# Patient Record
Sex: FEMALE | Race: ASIAN | NOT HISPANIC OR LATINO | Employment: FULL TIME | URBAN - METROPOLITAN AREA
[De-identification: names, ages, dates, MRNs, and addresses within clinical notes are randomized per-mention and may not be internally consistent; named-entity substitution may affect disease eponyms.]

---

## 2022-10-17 LAB
HEPATITIS B SURFACE ANTIGEN (EXTERNAL): NEGATIVE
HIV1+2 AB SERPL QL IA: NEGATIVE
HIV1+2 AB SERPL QL IA: NEGATIVE
RUBELLA ANTIBODY IGG (EXTERNAL): NORMAL

## 2022-12-18 ENCOUNTER — HOSPITAL ENCOUNTER (OUTPATIENT)
Facility: CLINIC | Age: 32
Discharge: HOME | End: 2022-12-18
Attending: OBSTETRICS & GYNECOLOGY | Admitting: OBSTETRICS & GYNECOLOGY
Payer: COMMERCIAL

## 2022-12-18 VITALS — RESPIRATION RATE: 20 BRPM | DIASTOLIC BLOOD PRESSURE: 61 MMHG | SYSTOLIC BLOOD PRESSURE: 109 MMHG | TEMPERATURE: 98.9 F

## 2022-12-18 DIAGNOSIS — N76.0 BV (BACTERIAL VAGINOSIS): Primary | ICD-10-CM

## 2022-12-18 DIAGNOSIS — B96.89 BV (BACTERIAL VAGINOSIS): Primary | ICD-10-CM

## 2022-12-18 LAB
ALBUMIN UR-MCNC: 10 MG/DL
APPEARANCE UR: CLEAR
BACTERIA #/AREA URNS HPF: ABNORMAL /HPF
BILIRUB UR QL STRIP: NEGATIVE
CLUE CELLS: PRESENT
COLOR UR AUTO: YELLOW
GLUCOSE UR STRIP-MCNC: NEGATIVE MG/DL
HGB UR QL STRIP: NEGATIVE
KETONES UR STRIP-MCNC: 80 MG/DL
LEUKOCYTE ESTERASE UR QL STRIP: ABNORMAL
MUCOUS THREADS #/AREA URNS LPF: PRESENT /LPF
NITRATE UR QL: NEGATIVE
PH UR STRIP: 6.5 [PH] (ref 5–7)
RBC URINE: 2 /HPF
SP GR UR STRIP: 1.02 (ref 1–1.03)
SQUAMOUS EPITHELIAL: 9 /HPF
TRICHOMONAS, WET PREP: ABNORMAL
UROBILINOGEN UR STRIP-MCNC: NORMAL MG/DL
WBC URINE: 6 /HPF
WBC'S/HIGH POWER FIELD, WET PREP: ABNORMAL
YEAST, WET PREP: ABNORMAL

## 2022-12-18 PROCEDURE — 87210 SMEAR WET MOUNT SALINE/INK: CPT | Performed by: OBSTETRICS & GYNECOLOGY

## 2022-12-18 PROCEDURE — 81001 URINALYSIS AUTO W/SCOPE: CPT | Performed by: OBSTETRICS & GYNECOLOGY

## 2022-12-18 PROCEDURE — 87086 URINE CULTURE/COLONY COUNT: CPT | Performed by: OBSTETRICS & GYNECOLOGY

## 2022-12-18 PROCEDURE — 250N000011 HC RX IP 250 OP 636: Performed by: OBSTETRICS & GYNECOLOGY

## 2022-12-18 PROCEDURE — G0463 HOSPITAL OUTPT CLINIC VISIT: HCPCS

## 2022-12-18 PROCEDURE — 250N000013 HC RX MED GY IP 250 OP 250 PS 637: Performed by: OBSTETRICS & GYNECOLOGY

## 2022-12-18 RX ORDER — ACETAMINOPHEN 325 MG/1
975 TABLET ORAL EVERY 6 HOURS PRN
Status: DISCONTINUED | OUTPATIENT
Start: 2022-12-18 | End: 2022-12-18 | Stop reason: HOSPADM

## 2022-12-18 RX ORDER — ONDANSETRON 4 MG/1
4 TABLET, FILM COATED ORAL EVERY 6 HOURS PRN
Status: DISCONTINUED | OUTPATIENT
Start: 2022-12-18 | End: 2022-12-18 | Stop reason: RX

## 2022-12-18 RX ORDER — METRONIDAZOLE 500 MG/1
500 TABLET ORAL 2 TIMES DAILY
Qty: 14 TABLET | Refills: 0 | Status: SHIPPED | OUTPATIENT
Start: 2022-12-18 | End: 2022-12-25

## 2022-12-18 RX ORDER — ONDANSETRON 4 MG/1
4 TABLET, ORALLY DISINTEGRATING ORAL EVERY 6 HOURS PRN
Status: DISCONTINUED | OUTPATIENT
Start: 2022-12-18 | End: 2022-12-18 | Stop reason: HOSPADM

## 2022-12-18 RX ORDER — LIDOCAINE 40 MG/G
CREAM TOPICAL
Status: DISCONTINUED | OUTPATIENT
Start: 2022-12-18 | End: 2022-12-18 | Stop reason: HOSPADM

## 2022-12-18 RX ADMIN — ACETAMINOPHEN 975 MG: 325 TABLET, FILM COATED ORAL at 17:03

## 2022-12-18 RX ADMIN — ONDANSETRON 4 MG: 4 TABLET, ORALLY DISINTEGRATING ORAL at 17:03

## 2022-12-18 ASSESSMENT — ACTIVITIES OF DAILY LIVING (ADL): ADLS_ACUITY_SCORE: 31

## 2022-12-18 NOTE — PLAN OF CARE
Patient here reporting pain. She reports she has had back pain that goes down to her buttocks, abdominal pain and inner left thigh pain that goes to groin. Also reports she has not been eating and/or drinking well since Thursday. Tried heat to area without improvement. Reports she last attempted to eat at 1900 last evening and had 3 emesis this am. Doppler . TOCO applied.  had called the unit for another patient and was updated of pt's c/os. Orders received.

## 2022-12-18 NOTE — PLAN OF CARE
Dr. Peters on unit and reviewed labs. Orders received to discharge patient. Diagnosis bacterial vaginosis. Will stop and  prescription on way home. Verbalizes understanding of plan of care.

## 2022-12-18 NOTE — DISCHARGE INSTRUCTIONS
Discharge Instruction for Undelivered Patients      You were seen for:  pain  We Consulted: Dr. Peters  You had (Test or Medicine):contraction monitoring    Diet:   Drink 8 to 12 glasses of liquids (milk, juice, water) every day.  You may eat meals and snacks.     Activity:  Call your doctor or nurse midwife if your baby is moving less than usual.     Call your provider if you notice:  Swelling in your face or increased swelling in your hands or legs.  Headaches that are not relieved by Tylenol (acetaminophen).  Changes in your vision (blurring: seeing spots or stars.)  Nausea (sick to your stomach) and vomiting (throwing up).   Weight gain of 5 pounds or more per week.  Heartburn that doesn't go away.  Signs of bladder infection: pain when you urinate (use the toilet), need to go more often and more urgently.  The bag of nation (rupture of membranes) breaks, or you notice leaking in your underwear.  Bright red blood in your underwear.  Abdominal (lower belly) or stomach pain.  Second (plus) baby: Contractions (tightening) less than 10 minutes apart and getting stronger.  *If less than 34 weeks: Contractions (tightening) more than 6 times in one hour.  Increase or change in vaginal discharge (note the color and amount)      Follow-up:  As scheduled in the clinic

## 2022-12-20 LAB — BACTERIA UR CULT: NORMAL

## 2022-12-29 ENCOUNTER — HOSPITAL ENCOUNTER (OUTPATIENT)
Facility: CLINIC | Age: 32
End: 2022-12-29
Admitting: OBSTETRICS & GYNECOLOGY

## 2022-12-29 ENCOUNTER — HOSPITAL ENCOUNTER (OUTPATIENT)
Facility: CLINIC | Age: 32
Discharge: HOME | End: 2022-12-29
Attending: OBSTETRICS & GYNECOLOGY | Admitting: OBSTETRICS & GYNECOLOGY
Payer: COMMERCIAL

## 2022-12-29 ENCOUNTER — HOSPITAL ENCOUNTER (EMERGENCY)
Facility: CLINIC | Age: 32
End: 2022-12-29

## 2022-12-29 VITALS — DIASTOLIC BLOOD PRESSURE: 72 MMHG | SYSTOLIC BLOOD PRESSURE: 104 MMHG | RESPIRATION RATE: 20 BRPM | TEMPERATURE: 97.9 F

## 2022-12-29 DIAGNOSIS — B96.89 BACTERIAL VAGINOSIS: Primary | ICD-10-CM

## 2022-12-29 DIAGNOSIS — K59.00 CONSTIPATION: ICD-10-CM

## 2022-12-29 DIAGNOSIS — N76.0 BACTERIAL VAGINOSIS: Primary | ICD-10-CM

## 2022-12-29 LAB
ALBUMIN UR-MCNC: NEGATIVE MG/DL
APPEARANCE UR: CLEAR
BACTERIA #/AREA URNS HPF: ABNORMAL /HPF
BILIRUB UR QL STRIP: NEGATIVE
CLUE CELLS: PRESENT
COLOR UR AUTO: ABNORMAL
GLUCOSE UR STRIP-MCNC: NEGATIVE MG/DL
HGB UR QL STRIP: NEGATIVE
KETONES UR STRIP-MCNC: ABNORMAL MG/DL
LEUKOCYTE ESTERASE UR QL STRIP: ABNORMAL
MUCOUS THREADS #/AREA URNS LPF: PRESENT /LPF
NITRATE UR QL: NEGATIVE
PH UR STRIP: 7 [PH] (ref 5–7)
RBC URINE: 1 /HPF
SP GR UR STRIP: 1.01 (ref 1–1.03)
SQUAMOUS EPITHELIAL: 4 /HPF
TRICHOMONAS, WET PREP: ABNORMAL
UROBILINOGEN UR STRIP-MCNC: NORMAL MG/DL
WBC URINE: 4 /HPF
WBC'S/HIGH POWER FIELD, WET PREP: ABNORMAL
YEAST, WET PREP: ABNORMAL

## 2022-12-29 PROCEDURE — 250N000013 HC RX MED GY IP 250 OP 250 PS 637: Performed by: OBSTETRICS & GYNECOLOGY

## 2022-12-29 PROCEDURE — 81001 URINALYSIS AUTO W/SCOPE: CPT | Performed by: OBSTETRICS & GYNECOLOGY

## 2022-12-29 PROCEDURE — 87210 SMEAR WET MOUNT SALINE/INK: CPT | Performed by: OBSTETRICS & GYNECOLOGY

## 2022-12-29 PROCEDURE — G0463 HOSPITAL OUTPT CLINIC VISIT: HCPCS

## 2022-12-29 RX ORDER — POLYETHYLENE GLYCOL 3350 17 G/17G
1 POWDER, FOR SOLUTION ORAL DAILY
Qty: 30 PACKET | Refills: 0 | Status: ON HOLD | OUTPATIENT
Start: 2022-12-29 | End: 2023-04-05

## 2022-12-29 RX ORDER — ACETAMINOPHEN 325 MG/1
975 TABLET ORAL EVERY 8 HOURS PRN
Status: DISCONTINUED | OUTPATIENT
Start: 2022-12-29 | End: 2022-12-29 | Stop reason: HOSPADM

## 2022-12-29 RX ORDER — METRONIDAZOLE 7.5 MG/G
1 GEL VAGINAL DAILY
Qty: 70 G | Refills: 0 | Status: ON HOLD | OUTPATIENT
Start: 2022-12-29 | End: 2023-04-05

## 2022-12-29 RX ORDER — HYDROXYZINE HYDROCHLORIDE 50 MG/1
50 TABLET, FILM COATED ORAL EVERY 4 HOURS PRN
Status: DISCONTINUED | OUTPATIENT
Start: 2022-12-29 | End: 2022-12-29 | Stop reason: HOSPADM

## 2022-12-29 RX ADMIN — ACETAMINOPHEN 975 MG: 325 TABLET, FILM COATED ORAL at 18:06

## 2022-12-29 NOTE — PROVIDER NOTIFICATION
12/29/22 1746   Provider Notification   Provider Name/Title Dr. Avila   Method of Notification In Department   Request Evaluate - Remote       Dr. Avila notified of pt arrival in department. Orders placed for UA, wet prep and comfort meds. Will update MD with results or sooner PRN.

## 2022-12-29 NOTE — PLAN OF CARE
Data: Patient presented to [facility]: 2022  5:26 PM.  Reason for maternal/fetal assessment is abdominal and low back pain. Patient reports today at work she was very active and felt sharp, lower left sided hip pain; additionally reports constipation with hard stool passing only every 3-4 days; also reports back pain that she states is similar to when she was evaluated on  and found to have BV; also foul smelling uring.  Patient is a .  Prenatal record reviewed. Pregnancy  has been complicated by hx of PPROM and PTD at 27weeks, hx CARI, THC in early pregnancy.  Gestational Age 18w1d. VSS. Fetal movement active - flutters. Patient denies uterine contractions, leaking of vaginal fluid/rupture of membranes, vaginal bleeding, pelvic pressure, nausea, vomiting, headache, visual disturbances, epigastric or URQ pain, significant edema. Denies dysuria, hematuria, or increased vaginal discharge. Support person is not present.   Action: Verbal consent for dopptones. Triage assessment completed. Bill of rights reviewed.  Response: Patient verbalized agreement with plan. Dr Radha Weber was on unit at time of patient arrival, initial orders placed per MD, will update with results and for further orders.

## 2022-12-30 NOTE — DISCHARGE INSTRUCTIONS
Discharge Instruction for Undelivered Patients      You were seen for:  back pain, lower abdominal pain, constipation  We Consulted: Dr. Avila  You had (Test or Medicine): Fetal heart rate via doppler, urinalysis, wet prep     Diet:   Drink 8 to 12 glasses of liquids (milk, juice, water) every day.  You may eat meals and snacks.     Activity:  Call your doctor or nurse midwife if your baby is moving less than usual.     Call your provider if you notice:  Swelling in your face or increased swelling in your hands or legs.  Headaches that are not relieved by Tylenol (acetaminophen).  Changes in your vision (blurring: seeing spots or stars.)  Nausea (sick to your stomach) and vomiting (throwing up).   Weight gain of 5 pounds or more per week.  Heartburn that doesn't go away.  Signs of bladder infection: pain when you urinate (use the toilet), need to go more often and more urgently.  The bag of nation (rupture of membranes) breaks, or you notice leaking in your underwear.  Bright red blood in your underwear.  Abdominal (lower belly) or stomach pain.  For first baby: Contractions (tightening) less than 5 minutes apart for one hour or more.  Second (plus) baby: Contractions (tightening) less than 10 minutes apart and getting stronger.  *If less than 34 weeks: Contractions (tightening) more than 6 times in one hour.  Increase or change in vaginal discharge (note the color and amount)    Follow-up:  As scheduled in the clinic  Call the clinic tomorrow, 12/20, at [phone] to verify that your vaginal progesterone prescription is on the way - start taking this ASAP

## 2022-12-30 NOTE — PROVIDER NOTIFICATION
12/29/22 1825   Provider Notification   Provider Name/Title Dr. Avila   Method of Notification Phone   Request Evaluate - Remote     MD notified of UA results reviewed WBCs WNL, nitrites negative, small amount leukocyte esterase, few bacteria. - WNL, TORB per MD discharge patient to home. See prev notes for medication orders and additional follow up plan.

## 2022-12-30 NOTE — PLAN OF CARE
Data: Patient assessed in the [facility] for low back pain, lower abdominal pain, constipation, BV.  Cervical exam not examined.  Membranes intact.  Contractions/uterine assessment none.  Action:  Presumed adequate fetal oxygenation documented (see flow record). Discharge instructions reviewed.  Patient instructed to report change in fetal movement, vaginal leaking of fluid or bleeding, abdominal pain, or any concerns related to the pregnancy to her nurse/physician.    Response: Orders to discharge home per Radha Weber.  Patient verbalized understanding of education and verbalized agreement with plan. Discharged to home at 1845.

## 2022-12-30 NOTE — PROVIDER NOTIFICATION
12/29/22 1810   Provider Notification   Provider Name/Title Dr. Avila   Method of Notification In Department   Request Evaluate - Remote       MD notified in department of wet prep +clue cells, UA pending. Also reviewed that she took PO treatment with her last occurrence on 12/18. Also, pt reports constipation with BM Q3-4 days that is hard and small. Additionally, pt has not started vaginal progesterone since she is waiting for it to be mailed to her.     VORB per MD:  -Send pt home with Rx for vaginal Metrogel   -Miralax 17g packets x30 days  -Give patient 24-hour clinic number ([phone]) to call tomorrow to confirm that vaginal progesterone is on the way, as she should start this ASAP.   -Update if UA results abnormal

## 2023-02-12 ENCOUNTER — HEALTH MAINTENANCE LETTER (OUTPATIENT)
Age: 33
End: 2023-02-12

## 2023-03-04 LAB
GROUP B STREPTOCOCCUS (EXTERNAL): POSITIVE
GROUP B STREPTOCOCCUS (EXTERNAL): POSITIVE

## 2023-04-05 ENCOUNTER — HOSPITAL ENCOUNTER (OUTPATIENT)
Facility: CLINIC | Age: 33
Setting detail: OBSERVATION
Discharge: HOME | End: 2023-04-06
Attending: OBSTETRICS & GYNECOLOGY | Admitting: OBSTETRICS & GYNECOLOGY
Payer: COMMERCIAL

## 2023-04-05 LAB
ABO/RH(D): NORMAL
ALBUMIN UR-MCNC: NEGATIVE MG/DL
AMPHETAMINES UR QL SCN: NORMAL
ANTIBODY SCREEN: NEGATIVE
APPEARANCE UR: CLEAR
BACTERIA #/AREA URNS HPF: ABNORMAL /HPF
BILIRUB UR QL STRIP: NEGATIVE
BZE UR QL SCN: NORMAL
CANNABINOIDS UR QL SCN: NORMAL
CLUE CELLS: ABNORMAL
COLOR UR AUTO: YELLOW
ERYTHROCYTE [DISTWIDTH] IN BLOOD BY AUTOMATED COUNT: 12.3 % (ref 10–15)
GLUCOSE UR STRIP-MCNC: NEGATIVE MG/DL
HCT VFR BLD AUTO: 35.9 % (ref 35–47)
HGB BLD-MCNC: 11.9 G/DL (ref 11.7–15.7)
HGB UR QL STRIP: NEGATIVE
KETONES UR STRIP-MCNC: NEGATIVE MG/DL
LEUKOCYTE ESTERASE UR QL STRIP: ABNORMAL
MCH RBC QN AUTO: 28.8 PG (ref 26.5–33)
MCHC RBC AUTO-ENTMCNC: 33.1 G/DL (ref 31.5–36.5)
MCV RBC AUTO: 87 FL (ref 78–100)
MUCOUS THREADS #/AREA URNS LPF: PRESENT /LPF
NITRATE UR QL: NEGATIVE
OPIATES UR QL SCN: NORMAL
PCP QUAL URINE (ROCHE): NORMAL
PH UR STRIP: 6.5 [PH] (ref 5–7)
PLATELET # BLD AUTO: 230 10E3/UL (ref 150–450)
RBC # BLD AUTO: 4.13 10E6/UL (ref 3.8–5.2)
RBC URINE: 1 /HPF
SP GR UR STRIP: 1.02 (ref 1–1.03)
SPECIMEN EXPIRATION DATE: NORMAL
SQUAMOUS EPITHELIAL: <1 /HPF
TRICHOMONAS, WET PREP: ABNORMAL
UROBILINOGEN UR STRIP-MCNC: NORMAL MG/DL
WBC # BLD AUTO: 11.3 10E3/UL (ref 4–11)
WBC URINE: 2 /HPF
WBC'S/HIGH POWER FIELD, WET PREP: ABNORMAL
YEAST, WET PREP: ABNORMAL

## 2023-04-05 PROCEDURE — 96361 HYDRATE IV INFUSION ADD-ON: CPT

## 2023-04-05 PROCEDURE — 86780 TREPONEMA PALLIDUM: CPT | Performed by: OBSTETRICS & GYNECOLOGY

## 2023-04-05 PROCEDURE — 80307 DRUG TEST PRSMV CHEM ANLYZR: CPT | Performed by: OBSTETRICS & GYNECOLOGY

## 2023-04-05 PROCEDURE — 120N000001 HC R&B MED SURG/OB

## 2023-04-05 PROCEDURE — 258N000003 HC RX IP 258 OP 636: Performed by: OBSTETRICS & GYNECOLOGY

## 2023-04-05 PROCEDURE — 250N000013 HC RX MED GY IP 250 OP 250 PS 637: Performed by: OBSTETRICS & GYNECOLOGY

## 2023-04-05 PROCEDURE — 99232 SBSQ HOSP IP/OBS MODERATE 35: CPT | Performed by: NURSE PRACTITIONER

## 2023-04-05 PROCEDURE — 86850 RBC ANTIBODY SCREEN: CPT | Performed by: OBSTETRICS & GYNECOLOGY

## 2023-04-05 PROCEDURE — 96372 THER/PROPH/DIAG INJ SC/IM: CPT | Performed by: OBSTETRICS & GYNECOLOGY

## 2023-04-05 PROCEDURE — 85027 COMPLETE CBC AUTOMATED: CPT | Performed by: OBSTETRICS & GYNECOLOGY

## 2023-04-05 PROCEDURE — 96366 THER/PROPH/DIAG IV INF ADDON: CPT

## 2023-04-05 PROCEDURE — 250N000011 HC RX IP 250 OP 636: Performed by: OBSTETRICS & GYNECOLOGY

## 2023-04-05 PROCEDURE — 86901 BLOOD TYPING SEROLOGIC RH(D): CPT | Performed by: OBSTETRICS & GYNECOLOGY

## 2023-04-05 PROCEDURE — 81001 URINALYSIS AUTO W/SCOPE: CPT | Performed by: OBSTETRICS & GYNECOLOGY

## 2023-04-05 PROCEDURE — G0463 HOSPITAL OUTPT CLINIC VISIT: HCPCS | Mod: 25

## 2023-04-05 PROCEDURE — 96365 THER/PROPH/DIAG IV INF INIT: CPT

## 2023-04-05 PROCEDURE — 96376 TX/PRO/DX INJ SAME DRUG ADON: CPT

## 2023-04-05 PROCEDURE — 87210 SMEAR WET MOUNT SALINE/INK: CPT | Performed by: OBSTETRICS & GYNECOLOGY

## 2023-04-05 PROCEDURE — 96374 THER/PROPH/DIAG INJ IV PUSH: CPT

## 2023-04-05 RX ORDER — CARBOPROST TROMETHAMINE 250 UG/ML
250 INJECTION, SOLUTION INTRAMUSCULAR
Status: DISCONTINUED | OUTPATIENT
Start: 2023-04-05 | End: 2023-04-06 | Stop reason: HOSPADM

## 2023-04-05 RX ORDER — METHYLERGONOVINE MALEATE 0.2 MG/ML
200 INJECTION INTRAVENOUS
Status: DISCONTINUED | OUTPATIENT
Start: 2023-04-05 | End: 2023-04-06 | Stop reason: HOSPADM

## 2023-04-05 RX ORDER — ONDANSETRON 2 MG/ML
4 INJECTION INTRAMUSCULAR; INTRAVENOUS EVERY 6 HOURS PRN
Status: DISCONTINUED | OUTPATIENT
Start: 2023-04-05 | End: 2023-04-06 | Stop reason: HOSPADM

## 2023-04-05 RX ORDER — KETOROLAC TROMETHAMINE 30 MG/ML
30 INJECTION, SOLUTION INTRAMUSCULAR; INTRAVENOUS
Status: DISCONTINUED | OUTPATIENT
Start: 2023-04-05 | End: 2023-04-06 | Stop reason: HOSPADM

## 2023-04-05 RX ORDER — OXYTOCIN 10 [USP'U]/ML
10 INJECTION, SOLUTION INTRAMUSCULAR; INTRAVENOUS
Status: DISCONTINUED | OUTPATIENT
Start: 2023-04-05 | End: 2023-04-06 | Stop reason: HOSPADM

## 2023-04-05 RX ORDER — BETAMETHASONE SODIUM PHOSPHATE AND BETAMETHASONE ACETATE 3; 3 MG/ML; MG/ML
12 INJECTION, SUSPENSION INTRA-ARTICULAR; INTRALESIONAL; INTRAMUSCULAR; SOFT TISSUE ONCE
Status: COMPLETED | OUTPATIENT
Start: 2023-04-05 | End: 2023-04-05

## 2023-04-05 RX ORDER — IBUPROFEN 800 MG/1
800 TABLET, FILM COATED ORAL
Status: DISCONTINUED | OUTPATIENT
Start: 2023-04-05 | End: 2023-04-06 | Stop reason: HOSPADM

## 2023-04-05 RX ORDER — FENTANYL CITRATE 50 UG/ML
100 INJECTION, SOLUTION INTRAMUSCULAR; INTRAVENOUS
Status: DISCONTINUED | OUTPATIENT
Start: 2023-04-05 | End: 2023-04-06 | Stop reason: HOSPADM

## 2023-04-05 RX ORDER — METOCLOPRAMIDE 10 MG/1
10 TABLET ORAL EVERY 6 HOURS PRN
Status: DISCONTINUED | OUTPATIENT
Start: 2023-04-05 | End: 2023-04-06 | Stop reason: HOSPADM

## 2023-04-05 RX ORDER — ONDANSETRON 4 MG/1
4 TABLET, ORALLY DISINTEGRATING ORAL EVERY 6 HOURS PRN
Status: DISCONTINUED | OUTPATIENT
Start: 2023-04-05 | End: 2023-04-06 | Stop reason: HOSPADM

## 2023-04-05 RX ORDER — MISOPROSTOL 200 UG/1
800 TABLET ORAL
Status: DISCONTINUED | OUTPATIENT
Start: 2023-04-05 | End: 2023-04-06 | Stop reason: HOSPADM

## 2023-04-05 RX ORDER — CITRIC ACID/SODIUM CITRATE 334-500MG
30 SOLUTION, ORAL ORAL
Status: DISCONTINUED | OUTPATIENT
Start: 2023-04-05 | End: 2023-04-06 | Stop reason: HOSPADM

## 2023-04-05 RX ORDER — NALOXONE HYDROCHLORIDE 0.4 MG/ML
0.2 INJECTION, SOLUTION INTRAMUSCULAR; INTRAVENOUS; SUBCUTANEOUS
Status: DISCONTINUED | OUTPATIENT
Start: 2023-04-05 | End: 2023-04-06 | Stop reason: HOSPADM

## 2023-04-05 RX ORDER — NIFEDIPINE 10 MG/1
20 CAPSULE ORAL EVERY 30 MIN PRN
Status: DISCONTINUED | OUTPATIENT
Start: 2023-04-05 | End: 2023-04-05 | Stop reason: HOSPADM

## 2023-04-05 RX ORDER — NALOXONE HYDROCHLORIDE 0.4 MG/ML
0.4 INJECTION, SOLUTION INTRAMUSCULAR; INTRAVENOUS; SUBCUTANEOUS
Status: DISCONTINUED | OUTPATIENT
Start: 2023-04-05 | End: 2023-04-06 | Stop reason: HOSPADM

## 2023-04-05 RX ORDER — BETAMETHASONE SODIUM PHOSPHATE AND BETAMETHASONE ACETATE 3; 3 MG/ML; MG/ML
12 INJECTION, SUSPENSION INTRA-ARTICULAR; INTRALESIONAL; INTRAMUSCULAR; SOFT TISSUE EVERY 24 HOURS
Status: DISCONTINUED | OUTPATIENT
Start: 2023-04-05 | End: 2023-04-05 | Stop reason: HOSPADM

## 2023-04-05 RX ORDER — NIFEDIPINE 10 MG/1
20 CAPSULE ORAL ONCE
Status: DISCONTINUED | OUTPATIENT
Start: 2023-04-05 | End: 2023-04-05 | Stop reason: HOSPADM

## 2023-04-05 RX ORDER — MISOPROSTOL 200 UG/1
400 TABLET ORAL
Status: DISCONTINUED | OUTPATIENT
Start: 2023-04-05 | End: 2023-04-06 | Stop reason: HOSPADM

## 2023-04-05 RX ORDER — METOCLOPRAMIDE HYDROCHLORIDE 5 MG/ML
10 INJECTION INTRAMUSCULAR; INTRAVENOUS EVERY 6 HOURS PRN
Status: DISCONTINUED | OUTPATIENT
Start: 2023-04-05 | End: 2023-04-06 | Stop reason: HOSPADM

## 2023-04-05 RX ORDER — SODIUM CHLORIDE, SODIUM LACTATE, POTASSIUM CHLORIDE, CALCIUM CHLORIDE 600; 310; 30; 20 MG/100ML; MG/100ML; MG/100ML; MG/100ML
INJECTION, SOLUTION INTRAVENOUS CONTINUOUS
Status: DISCONTINUED | OUTPATIENT
Start: 2023-04-05 | End: 2023-04-06 | Stop reason: HOSPADM

## 2023-04-05 RX ORDER — PENICILLIN G POTASSIUM 5000000 [IU]/1
5 INJECTION, POWDER, FOR SOLUTION INTRAMUSCULAR; INTRAVENOUS ONCE
Status: DISCONTINUED | OUTPATIENT
Start: 2023-04-05 | End: 2023-04-05 | Stop reason: HOSPADM

## 2023-04-05 RX ORDER — OXYTOCIN/0.9 % SODIUM CHLORIDE 30/500 ML
340 PLASTIC BAG, INJECTION (ML) INTRAVENOUS CONTINUOUS PRN
Status: DISCONTINUED | OUTPATIENT
Start: 2023-04-05 | End: 2023-04-06 | Stop reason: HOSPADM

## 2023-04-05 RX ORDER — NIFEDIPINE 10 MG/1
20 CAPSULE ORAL EVERY 30 MIN PRN
Status: DISCONTINUED | OUTPATIENT
Start: 2023-04-05 | End: 2023-04-06 | Stop reason: HOSPADM

## 2023-04-05 RX ORDER — NIFEDIPINE 10 MG/1
20 CAPSULE ORAL ONCE
Status: COMPLETED | OUTPATIENT
Start: 2023-04-05 | End: 2023-04-05

## 2023-04-05 RX ORDER — PENICILLIN G POTASSIUM 5000000 [IU]/1
5 INJECTION, POWDER, FOR SOLUTION INTRAMUSCULAR; INTRAVENOUS ONCE
Status: COMPLETED | OUTPATIENT
Start: 2023-04-05 | End: 2023-04-05

## 2023-04-05 RX ORDER — PENICILLIN G 3000000 [IU]/50ML
3 INJECTION, SOLUTION INTRAVENOUS EVERY 4 HOURS
Status: DISCONTINUED | OUTPATIENT
Start: 2023-04-05 | End: 2023-04-06 | Stop reason: HOSPADM

## 2023-04-05 RX ORDER — PROCHLORPERAZINE 25 MG
25 SUPPOSITORY, RECTAL RECTAL EVERY 12 HOURS PRN
Status: DISCONTINUED | OUTPATIENT
Start: 2023-04-05 | End: 2023-04-06 | Stop reason: HOSPADM

## 2023-04-05 RX ORDER — NIFEDIPINE 10 MG/1
20 CAPSULE ORAL EVERY 6 HOURS
Status: DISCONTINUED | OUTPATIENT
Start: 2023-04-05 | End: 2023-04-05 | Stop reason: HOSPADM

## 2023-04-05 RX ORDER — OXYTOCIN/0.9 % SODIUM CHLORIDE 30/500 ML
100-340 PLASTIC BAG, INJECTION (ML) INTRAVENOUS CONTINUOUS PRN
Status: DISCONTINUED | OUTPATIENT
Start: 2023-04-05 | End: 2023-04-06 | Stop reason: HOSPADM

## 2023-04-05 RX ORDER — PROCHLORPERAZINE MALEATE 10 MG
10 TABLET ORAL EVERY 6 HOURS PRN
Status: DISCONTINUED | OUTPATIENT
Start: 2023-04-05 | End: 2023-04-06 | Stop reason: HOSPADM

## 2023-04-05 RX ORDER — PENICILLIN G 3000000 [IU]/50ML
3 INJECTION, SOLUTION INTRAVENOUS EVERY 4 HOURS
Status: DISCONTINUED | OUTPATIENT
Start: 2023-04-05 | End: 2023-04-05 | Stop reason: HOSPADM

## 2023-04-05 RX ORDER — NIFEDIPINE 10 MG/1
20 CAPSULE ORAL EVERY 6 HOURS
Status: DISCONTINUED | OUTPATIENT
Start: 2023-04-05 | End: 2023-04-06 | Stop reason: HOSPADM

## 2023-04-05 RX ORDER — ACETAMINOPHEN 325 MG/1
650 TABLET ORAL EVERY 4 HOURS PRN
Status: DISCONTINUED | OUTPATIENT
Start: 2023-04-05 | End: 2023-04-06 | Stop reason: HOSPADM

## 2023-04-05 RX ORDER — TRANEXAMIC ACID 10 MG/ML
1 INJECTION, SOLUTION INTRAVENOUS EVERY 30 MIN PRN
Status: DISCONTINUED | OUTPATIENT
Start: 2023-04-05 | End: 2023-04-06 | Stop reason: HOSPADM

## 2023-04-05 RX ADMIN — SODIUM CHLORIDE, POTASSIUM CHLORIDE, SODIUM LACTATE AND CALCIUM CHLORIDE: 600; 310; 30; 20 INJECTION, SOLUTION INTRAVENOUS at 11:22

## 2023-04-05 RX ADMIN — PENICILLIN G 3 MILLION UNITS: 3000000 INJECTION, SOLUTION INTRAVENOUS at 16:00

## 2023-04-05 RX ADMIN — BETAMETHASONE SODIUM PHOSPHATE AND BETAMETHASONE ACETATE 12 MG: 3; 3 INJECTION, SUSPENSION INTRA-ARTICULAR; INTRALESIONAL; INTRAMUSCULAR at 11:30

## 2023-04-05 RX ADMIN — PENICILLIN G 3 MILLION UNITS: 3000000 INJECTION, SOLUTION INTRAVENOUS at 20:16

## 2023-04-05 RX ADMIN — PENICILLIN G POTASSIUM 5 MILLION UNITS: 5000000 POWDER, FOR SOLUTION INTRAMUSCULAR; INTRAPLEURAL; INTRATHECAL; INTRAVENOUS at 11:54

## 2023-04-05 RX ADMIN — NIFEDIPINE 20 MG: 10 CAPSULE ORAL at 11:51

## 2023-04-05 RX ADMIN — NIFEDIPINE 20 MG: 10 CAPSULE ORAL at 17:59

## 2023-04-05 ASSESSMENT — ACTIVITIES OF DAILY LIVING (ADL)
ADLS_ACUITY_SCORE: 18
ADLS_ACUITY_SCORE: 33
ADLS_ACUITY_SCORE: 18

## 2023-04-05 NOTE — PLAN OF CARE
Data: Patient presented to [facility]: 2023 10:18 AM.  Reason for maternal/fetal assessment advanced cervical dilatation at 32 weeks gestation. Patient reports increased vaginal pressure and lower uterine discomfort. She was seen in the clinic yesterday with a cervical exam of 3 cm. Patient denies leaking of vaginal fluid/rupture of membranes, vaginal bleeding, nausea, vomiting, headache, visual disturbances, epigastric or RUQ pain, significant edema. Patient reports fetal movement is normal. Patient is a 32w0d . Prenatal record reviewed. Pregnancy has been complicated by cervical dilation and history of 27 week delivery . Support person is not present.     Fetal HR baseline was 130, variability is moderate (amplitude range 6 to 25 bpm). Accelerations: increase 15 bpm above baseline lasting 15 seconds. Decelerations: absent. Uterine assessment is mild by palpation during contractions and soft by palpation at rest. Cervix 3.5 cm dilated and 50% effaced. Fetal station -2. Fetal presentation   per cervical exam. Membranes: intact but buldging.    Vital signs wnl. Patient reports pain and is coping.     Action: Verbal consent for EFM. Triage assessment completed. Patient does not meet criteria for early labor discharge.     Response: Patient verbalized agreement with plan. Will contact Dr Jones with update and for further orders.

## 2023-04-05 NOTE — CONSULTS
_            [facility]               Neonatology Advanced Practice Antepartum Counseling Consult      I was asked to provide antepartum counseling for Tatyana Amador at the request of Raina Jones MD secondary to PTL. Ms. Amador is currently 32 weeks and has a hx significant for  delivery. Betamethasone was administered on  and . Ms. Amador was counseled on the expected hospital course, potential risks, and outcomes associated with an infant born at approximately 32 weeks gestation. The counseling included: morbidity, mortality, initial delivery room stabilization, respiratory course, lung development, RDS, patent ductus arteriosus, retinopathy of prematurity, hyperbilirubinemia, hemodynamic support, infection, intraventricular hemorrhage, nutrition, growth and development, and long term outcomes. Please feel free to call with any additional questions or concerns.      Floor Time (min): 10  Face to Face Time (min): 30  Total Time (minutes): 40  More than 50% of my time was spent in direct, face to face, antepartum counseling with the above patient.        TAMIKA Tijerina-CNP 2023 3:39 PM   Advanced Practice Service    Intensive Care Unit  [facility]

## 2023-04-05 NOTE — PROVIDER NOTIFICATION
04/05/23 1409   Provider Notification   Provider Name/Title Dr. Jones   Method of Notification In Department   Request Evaluate in Person   Notification Reason Uterine Activity;Status Update     MD updated: Patient has had 2 contractions in the last hour. MD viewing fetal monitor strip at the desk. Plan to continue to monitor and give scheduled nifedipine every 6 hours.

## 2023-04-05 NOTE — LETTER
[facility]  [address] [place] [address]  Phone: [phone]  Fax: [phone]    April 6, 2023        Tatyana Amador  [address] [place] [address]          To whom it may concern:    RE: Jose Alberto Amador was admitted to the hospital and under our care. Please excuse her partner Jose Alberto Funes from work on 4/5/23-4/7/23.  He may return to work on Monday 4/10/23.       Please contact me for questions or concerns.      Sincerely,    Dr. Jackie Peters, DO

## 2023-04-05 NOTE — H&P
[facility]  OB History and Physical      Tatyana Amador MRN# [id number]   Age: 33 year old YOB: 1990     CC:  Feeling pressure    HPI:  Ms. Tatyana Amador is a 33 year old  at 32w0d by 6 wk US, who presents with pressure. Approximate LMP so this established her due date of 23.  She denies contractions, just pressure, vaginal bleeding, and loss of fluid.   + normal fetal movement.    Pregnancy Complications:  1.  no    Prenatal Labs:   No results found for: ABO, RH, AS, HEPBANG, CHPCRT, GCPCRT, TREPAB, RPR, RUBELLAABIGG, HGB, HIV    GBS Status:   No results found for: GBS  GBS done yesterday, pending    Ultrasounds  1. 3/14/23 EFW 1441 gm 3 lb 2.8 oz 68%    OB History  OB History    Para Term  AB Living   7 3 2 1 3 3   SAB IAB Ectopic Multiple Live Births   2 1 0 0 3      # Outcome Date GA Lbr Damian/2nd Weight Sex Delivery Anes PTL Lv   7 Current            6 Term 19    F Vag-Spont   MANJU      Name: Nilae   5  12 27w0d   M Vag-Spont  Y MANJU      Name: Tre   4 Term 08     Vag-Spont   MANJU      Name: Harmany   3 SAB            2 IAB            1 SAB                PMHx: noner  No past medical history on file.  PSHx: no  No past surgical history on file.  Meds:   Medications Prior to Admission   Medication Sig Dispense Refill Last Dose                       Prenatal MV-Min-Fe Fum-FA-DHA (PRENATAL 1 PO) Take 1 tablet by mouth daily        progesterone 200 MG VA SUPP Place 200 mg vaginally daily Has not started yet        Allergies:    Allergies   Allergen Reactions     Latex Rash      FmHx: No family history on file.  SocHx: She denies any tobacco, alcohol, or other drug use during this pregnancy.    ROS:   Complete 10-point ROS negative except as noted in HPI.She has a minor  headache, no blurry vision, chest pain, shortness of breath,  vomiting, dysuria, hematuria or extremity edema. Some nausea    PE:  Vit:   Patient Vitals for the past  4 hrs:   BP Temp Temp src Pulse Resp   23 1020 (P) 113/74 (P) 98  F (36.7  C) (P) Oral (P) 74 (P) 16      Gen: Well-appearing, NAD, comfortable   CV: rrr, no   Pulm: Ctab   Abd: Soft, gravid, non-tender,   Ext: no LE edema b/l  Cx: 3-4 BBOW    Pres:  vtx by US  EFW:  4-5 lbs by [place]  Memb: intact              FHT: Baseline 150, good variability, positive accelerations, no decelerations   [place]: q2-4 contractions            Assessment  Ms. Tatyana Amador is a 33 year old , at 32w0d by LMP, who presents with pressure and PTL.    Plan  Admit to L&D  Labor: At high risk for  delivery  FWB: Category 1 FHT.  Continue EFM and toco  Pain: Does not want epidural for analgesia  PNC: Rh +, Rubella I, GBS + on 3/4/23, GCT elevated but 2 hr test ok, Placenta ant  Fen/GI: Clear liquid diet, IVF  PTL: repeat beta methasone, nifedipine asap. Start PCN for GBS+ (awaiting result of yesterday's swab, but last month is still valid.     Raina Jones MD  [phone]  2023, 10:54 AM

## 2023-04-05 NOTE — PROVIDER NOTIFICATION
23 1100   Provider Notification   Provider Name/Title Dr. Jones   Method of Notification At Bedside   Request Evaluate in Person   Notification Reason Patient Arrived;Membrane Status;Labor Status;Uterine Activity;Pain;Status Update   MD at bedside to discuss POC with patient after updated by JETT Wen that cervix is 3-4/50/-2 with BBOW, vaginal pressure and history of  delivery at 27 weeks.  labor and intrapartum orders place. Plan for second dose of betamethasone, penicillin for GBS unknown (was collected yesterday, but also was positive 3/1/2023), nifedipine per  labor orders, NNP consult and standard intrapartum orders. MD discussed continued monitoring/evaluation at this time d/t history, SVE, pain and contractions.    Patient informed of need for urine tox due to current or prior illicit or unprescribed drug use including maternal self-report, patient states she stopped using THC once she found out she was pregnant.  Verbal consent obtained and maternal urine sent. Umbilical cord segment will be sent for toxicology.

## 2023-04-06 ENCOUNTER — HOSPITAL ENCOUNTER (OUTPATIENT)
Facility: CLINIC | Age: 33
Setting detail: OBSERVATION
End: 2023-04-06
Admitting: OBSTETRICS & GYNECOLOGY
Payer: COMMERCIAL

## 2023-04-06 VITALS
SYSTOLIC BLOOD PRESSURE: 113 MMHG | HEART RATE: 90 BPM | RESPIRATION RATE: 16 BRPM | DIASTOLIC BLOOD PRESSURE: 62 MMHG | TEMPERATURE: 98.1 F

## 2023-04-06 PROBLEM — O60.00 PRETERM LABOR: Status: ACTIVE | Noted: 2023-04-06

## 2023-04-06 LAB — T PALLIDUM AB SER QL: NONREACTIVE

## 2023-04-06 PROCEDURE — 96376 TX/PRO/DX INJ SAME DRUG ADON: CPT

## 2023-04-06 PROCEDURE — 250N000011 HC RX IP 250 OP 636: Performed by: OBSTETRICS & GYNECOLOGY

## 2023-04-06 PROCEDURE — 250N000013 HC RX MED GY IP 250 OP 250 PS 637: Performed by: OBSTETRICS & GYNECOLOGY

## 2023-04-06 PROCEDURE — 258N000003 HC RX IP 258 OP 636: Performed by: OBSTETRICS & GYNECOLOGY

## 2023-04-06 RX ADMIN — PENICILLIN G 3 MILLION UNITS: 3000000 INJECTION, SOLUTION INTRAVENOUS at 00:12

## 2023-04-06 RX ADMIN — PENICILLIN G 3 MILLION UNITS: 3000000 INJECTION, SOLUTION INTRAVENOUS at 08:09

## 2023-04-06 RX ADMIN — NIFEDIPINE 20 MG: 10 CAPSULE ORAL at 06:05

## 2023-04-06 RX ADMIN — SODIUM CHLORIDE, POTASSIUM CHLORIDE, SODIUM LACTATE AND CALCIUM CHLORIDE: 600; 310; 30; 20 INJECTION, SOLUTION INTRAVENOUS at 06:09

## 2023-04-06 RX ADMIN — NIFEDIPINE 20 MG: 10 CAPSULE ORAL at 00:13

## 2023-04-06 RX ADMIN — PENICILLIN G 3 MILLION UNITS: 3000000 INJECTION, SOLUTION INTRAVENOUS at 12:31

## 2023-04-06 RX ADMIN — PENICILLIN G 3 MILLION UNITS: 3000000 INJECTION, SOLUTION INTRAVENOUS at 04:11

## 2023-04-06 ASSESSMENT — ACTIVITIES OF DAILY LIVING (ADL)
ADLS_ACUITY_SCORE: 18

## 2023-04-06 NOTE — PLAN OF CARE
Patient states she has been sleeping well and feeling rare contractions. Fetal monitoring completed.  Will return at 0600 with nifedipine.  Patient will call RN if any contractions, SROM, or any other concerns.

## 2023-04-06 NOTE — PLAN OF CARE
Patient states she has been sleeping well and feeling rare contractions. Fetal monitoring completed.  Will return at 0400 for monitoring and antibiotics.  Patient will call RN if any contractions, SROM, or any other concerns.

## 2023-04-06 NOTE — UTILIZATION REVIEW
Admission Status; Secondary Review Determination       Under the authority of the Utilization Management Committee, the utilization review process indicated a secondary review on the above patient. The review outcome is based on review of the medical records, discussions with staff, and applying clinical experience noted on the date of the review.     (x) Inpatient Status Appropriate - This patient's medical care is consistent with medical management for inpatient care and reasonable inpatient medical practice.     RATIONALE FOR DETERMINATION   33-year-old female  7 para 2-1-3-3 who presented at 32 weeks gestation with pelvic pressure and advanced cervical dilation.  Established due date of 2023.  High risk pregnancy with history of cervical dilation and delivery at 27 weeks premature delivery.  Has received betamethasone, nifedipine and penicillin G.  Getting continuous fetal monitoring.    At the time of admission with the information available to the attending physician more than 2 nights hospital complex care was anticipated, based on patient risk of adverse outcome if treated as outpatient and complex care required. Inpatient admission is appropriate based on the Medicare guidelines.     However, if there is rapid improvement in her status and she ends up discharging home later today (23), OBSERVATION status is most appropriate.     This document was produced using voice recognition software.    The information on this document is developed by the utilization review team in order for the business office to ensure compliance. This only denotes the appropriateness of proper admission status and does not reflect the quality of care rendered.   The definitions of Inpatient Status and Observation Status used in making the determination above are those provided in the CMS Coverage Manual, Chapter 1 and Chapter 6, section 70.4.     Sincerely,   Francesca Abreu MD  Utilization Review  Physician  Advisor  [facility].

## 2023-04-06 NOTE — DISCHARGE SUMMARY
Antepartum Discharge Summary    Admission date: 2023  Discharge date: 23    Attending: Jackie Peters,      Admission Diagnoses:   - Intrauterine pregnancy @ 32w0d  -  labor    Discharge Diagnoses:  - Intrauterine pregnancy @ 32w1d  -  labor    Procedures:  - Fetal and uterine monitoring    Admit HPI:  Tatyana Amador is a  33 year old  @ 32w1d who was admitted to antepartum due to  contractions and labor. Her pregnancy has been complicated by history of  labor with short cervix with funneling, history of covid, GBS+. Please see her admit H&P for full details of her PMH, PSH, Meds, Allergies and exam on admit.    Hospital course:  Tatyana Amador had fetal and uterine monitoring that showed contractions every 2-4 minutes on arrival. She was started on IV fluids and nifedipine orally. She received her second dose of BMZ.  At 24 hours after last BMZ dose the nifedipine was held and she had no further contractions.  Her cervix remained unchanged and she was discharge to home.     Discharge/Disposition:  Tatyana Amador was discharged to home in stable condition with the following instructions/medications:  - Call for any contractions 5 minutes apart for one hour, vaginal bleeding, leakage or gush of fluid, or decreased fetal movement (less than 10 kicks in 2 hours).  - She was instructed to follow-up with her primary OB within 1 week.          Jackie Peters,     2023

## 2023-04-06 NOTE — DISCHARGE INSTRUCTIONS
"Discharge Instruction for Undelivered Patients      You were seen for: Labor Assessment  We Consulted: Dr. Peters  You had (Test or Medicine): Fetal Monitoring, IV Fluids, IV Penicillin, Nifedipine, Vaginal Exams     Diet:   Drink 8 to 12 glasses of liquids (milk, juice, water) every day.  You may eat meals and snacks.     Activity:  Rest the pelvic area. Nothing per the vagina, no sex, clitoral, breast or nipple stimulation. Try to keep activity around the house \"light.\"     Call your provider if you notice:  Swelling in your face or increased swelling in your hands or legs.  Headaches that are not relieved by Tylenol (acetaminophen).  Changes in your vision (blurring: seeing spots or stars.)  Nausea (sick to your stomach) and vomiting (throwing up).   Weight gain of 5 pounds or more per week.  Heartburn that doesn't go away.  Signs of bladder infection: pain when you urinate (use the toilet), need to go more often and more urgently.  The bag of nation (rupture of membranes) breaks, or you notice leaking in your underwear.  Bright red blood in your underwear.  Abdominal (lower belly) or stomach pain.  For first baby: Contractions (tightening) less than 5 minutes apart for one hour or more.  Second (plus) baby: Contractions (tightening) less than 10 minutes apart and getting stronger.  *If less than 34 weeks: Contractions (tightening) more than 6 times in one hour.  Increase or change in vaginal discharge (note the color and amount)  Other:     Follow-up:  As scheduled in the clinic          "

## 2023-04-06 NOTE — PROVIDER NOTIFICATION
04/06/23 0811   Provider Notification   Provider Name/Title Dr. Peters   Method of Notification In Department   Request Evaluate - Remote   Notification Reason Status Update     Dr. Peters in department, briefly updated on patients status. MD states to hold 1200 dose of Nifedipine and see if contractions return in the afternoon. If no Uc's then anticipate PM discharge.

## 2023-04-06 NOTE — PROVIDER NOTIFICATION
04/05/23 1945   Provider Notification   Provider Name/Title Dr. Reyes   Method of Notification Phone   Request Evaluate - Remote   Notification Reason Status Update     Orders received to take off continous monitoring and can switch to every 4 hours of monitoring and monitor for an hour.  If contractions increase in frequency go back to continuous.  Will continue oral nifedipine and Iv ABX at this time.

## 2023-05-03 ENCOUNTER — HOSPITAL ENCOUNTER (OUTPATIENT)
Facility: CLINIC | Age: 33
Discharge: HOME | End: 2023-05-03
Attending: OBSTETRICS & GYNECOLOGY | Admitting: OBSTETRICS & GYNECOLOGY
Payer: COMMERCIAL

## 2023-05-03 VITALS
HEART RATE: 99 BPM | DIASTOLIC BLOOD PRESSURE: 76 MMHG | RESPIRATION RATE: 18 BRPM | HEIGHT: 65 IN | SYSTOLIC BLOOD PRESSURE: 125 MMHG | WEIGHT: 176 LBS | BODY MASS INDEX: 29.32 KG/M2 | TEMPERATURE: 98.9 F

## 2023-05-03 PROBLEM — Z36.89 ENCOUNTER FOR TRIAGE IN PREGNANT PATIENT: Status: ACTIVE | Noted: 2023-05-03

## 2023-05-03 LAB
ALBUMIN UR-MCNC: 30 MG/DL
APPEARANCE UR: ABNORMAL
BILIRUB UR QL STRIP: NEGATIVE
COLOR UR AUTO: YELLOW
GLUCOSE UR STRIP-MCNC: NEGATIVE MG/DL
HGB UR QL STRIP: ABNORMAL
HYALINE CASTS: 2 /LPF
KETONES UR STRIP-MCNC: ABNORMAL MG/DL
LEUKOCYTE ESTERASE UR QL STRIP: ABNORMAL
MUCOUS THREADS #/AREA URNS LPF: PRESENT /LPF
NITRATE UR QL: NEGATIVE
PH UR STRIP: 6.5 [PH] (ref 5–7)
RBC URINE: >182 /HPF
SP GR UR STRIP: 1.02 (ref 1–1.03)
SQUAMOUS EPITHELIAL: 17 /HPF
UROBILINOGEN UR STRIP-MCNC: NORMAL MG/DL
WBC URINE: 26 /HPF

## 2023-05-03 PROCEDURE — 250N000013 HC RX MED GY IP 250 OP 250 PS 637: Performed by: OBSTETRICS & GYNECOLOGY

## 2023-05-03 PROCEDURE — G0463 HOSPITAL OUTPT CLINIC VISIT: HCPCS

## 2023-05-03 PROCEDURE — 87086 URINE CULTURE/COLONY COUNT: CPT | Performed by: OBSTETRICS & GYNECOLOGY

## 2023-05-03 PROCEDURE — 81001 URINALYSIS AUTO W/SCOPE: CPT | Performed by: OBSTETRICS & GYNECOLOGY

## 2023-05-03 RX ORDER — ACETAMINOPHEN 325 MG/1
650 TABLET ORAL EVERY 4 HOURS PRN
Status: DISCONTINUED | OUTPATIENT
Start: 2023-05-03 | End: 2023-05-03 | Stop reason: HOSPADM

## 2023-05-03 RX ORDER — LIDOCAINE 40 MG/G
CREAM TOPICAL
Status: DISCONTINUED | OUTPATIENT
Start: 2023-05-03 | End: 2023-05-03 | Stop reason: HOSPADM

## 2023-05-03 RX ORDER — ACETAMINOPHEN 650 MG/1
650 SUPPOSITORY RECTAL EVERY 4 HOURS PRN
Status: DISCONTINUED | OUTPATIENT
Start: 2023-05-03 | End: 2023-05-03 | Stop reason: HOSPADM

## 2023-05-03 RX ADMIN — ACETAMINOPHEN 650 MG: 325 TABLET ORAL at 15:36

## 2023-05-03 ASSESSMENT — ACTIVITIES OF DAILY LIVING (ADL)
ADLS_ACUITY_SCORE: 35
ADLS_ACUITY_SCORE: 35

## 2023-05-03 NOTE — PROVIDER NOTIFICATION
Data: Patient presented to [facility]: 5/3/2023  2:47 PM.  Reason for maternal/fetal assessment is uterine contractions/RO labor. Patient reports lower back pain, pelvic pressure and contractions.  Patient is a .  Prenatal record reviewed. Pregnancy  has been complicated by  labor/contractions and history of  delivery.  Gestational Age 36w0d. VSS. Fetal movement active. Patient denies leaking of vaginal fluid/rupture of membranes, vaginal bleeding. Support person is present.   Action: Verbal consent for EFM. Triage assessment completed. Bill of rights reviewed.  Response: Patient verbalized agreement with plan. Will contact Dr Raina Jones with update and for further orders.   1534- Dr. Jones at bedside. Plan per provider: send urine for urinalysis and give patient Tylenol and regular diet.

## 2023-05-03 NOTE — PROGRESS NOTES
"32 yo  at 36w0 d here for pelvic pressure and pain and back pain. Denies fevers or dysuria, but does have some urinary urgency.     /76   Pulse 99   Temp 98.9  F (37.2  C) (Oral)   Resp 18   Ht 1.651 m (5' 5\")   Wt 79.8 kg (176 lb)   BMI 29.29 kg/m     Gen: tired female NAD  Abd gravid NT  Back no CVAT.   FHTS reassuring  TOCO rare  Cvx 3.5 per RN, unchanged    Urine dark sharath    A/P 36 w with urinary urgency and pelvic pressure  Suspect musculoskeletal discomfort of pregnancy but will send urine to rule out UTI.   Recommend comfort measures for back pain: heat, tylenol, massage. If symptoms worsen, let us know.   "

## 2023-05-03 NOTE — PLAN OF CARE
Discharge instructions reviewed with patient. Understanding verbalized. Discharged to home ambulatory with daughter.

## 2023-05-03 NOTE — PLAN OF CARE
Data: Patient assessed in the [facility]place for abdominal pain, pelvic pain.  Cervical exam dilated to 3.5.  Membranes intact.  Contractions/uterine assessment occasional contractions.  Action:  Presumed adequate fetal oxygenation documented (see flow record). Discharge instructions reviewed.  Patient instructed to report change in fetal movement, vaginal leaking of fluid or bleeding, abdominal pain, or any concerns related to the pregnancy to her nurse/physician.    Response: Orders to discharge home per Raina Jones.  Patient verbalized understanding of education and verbalized agreement with plan. Discharged to home at 1645.

## 2023-05-03 NOTE — DISCHARGE INSTRUCTIONS
Discharge Instruction for Undelivered Patients      You were seen for: Labor Assessment  We Consulted: Dr. Jones  You had (Test or Medicine):fetal and uterine monitoring, SVE and urinalysis     Diet:   Drink 8 to 12 glasses of liquids (milk, juice, water) every day.  You may eat meals and snacks.  Increase fluid intake as able.     Activity:  Call your doctor or nurse midwife if your baby is moving less than usual.     Call your provider if you notice:  Swelling in your face or increased swelling in your hands or legs.  Headaches that are not relieved by Tylenol (acetaminophen).  Changes in your vision (blurring: seeing spots or stars.)  Nausea (sick to your stomach) and vomiting (throwing up).   Weight gain of 5 pounds or more per week.  Heartburn that doesn't go away.  Signs of bladder infection: pain when you urinate (use the toilet), need to go more often and more urgently.  The bag of nation (rupture of membranes) breaks, or you notice leaking in your underwear.  Bright red blood in your underwear.  Abdominal (lower belly) or stomach pain.  For first baby: Contractions (tightening) less than 5 minutes apart for one hour or more.  Second (plus) baby: Contractions (tightening) less than 10 minutes apart and getting stronger.  *If less than 34 weeks: Contractions (tightening) more than 6 times in one hour.  Increase or change in vaginal discharge (note the color and amount)  Other:  prescription at your pharmacy, take as directed.  Call if increase in pain, questions or concerns.    Follow-up:  As scheduled in the clinic

## 2023-05-03 NOTE — PROVIDER NOTIFICATION
05/03/23 9724   Provider Notification   Provider Name/Title Dr. Jones   Method of Notification Phone   Request Evaluate-Remote     UA results reported. Will call in prescription to patient's pharmacy. Plan per provider: discharge to home with prescription for Keflex. Patent to follow up at next scheduled appointment. Patient to call if increase in symptoms, questions or concerns. Patient OK with POC.

## 2023-05-06 LAB — BACTERIA UR CULT: NO GROWTH

## 2023-05-08 ENCOUNTER — HOSPITAL ENCOUNTER (OUTPATIENT)
Facility: CLINIC | Age: 33
Discharge: HOME | End: 2023-05-08
Attending: OBSTETRICS & GYNECOLOGY | Admitting: OBSTETRICS & GYNECOLOGY
Payer: COMMERCIAL

## 2023-05-08 VITALS
RESPIRATION RATE: 18 BRPM | BODY MASS INDEX: 29.32 KG/M2 | HEIGHT: 65 IN | DIASTOLIC BLOOD PRESSURE: 76 MMHG | WEIGHT: 176 LBS | TEMPERATURE: 98.3 F | SYSTOLIC BLOOD PRESSURE: 114 MMHG

## 2023-05-08 DIAGNOSIS — Z36.89 ENCOUNTER FOR TRIAGE IN PREGNANT PATIENT: Primary | ICD-10-CM

## 2023-05-08 PROCEDURE — G0463 HOSPITAL OUTPT CLINIC VISIT: HCPCS

## 2023-05-08 RX ORDER — CEPHALEXIN 500 MG/1
500 CAPSULE ORAL 3 TIMES DAILY
COMMUNITY

## 2023-05-08 RX ORDER — LIDOCAINE 40 MG/G
CREAM TOPICAL
Status: DISCONTINUED | OUTPATIENT
Start: 2023-05-08 | End: 2023-05-08 | Stop reason: HOSPADM

## 2023-05-08 ASSESSMENT — ACTIVITIES OF DAILY LIVING (ADL): ADLS_ACUITY_SCORE: 31

## 2023-05-08 NOTE — PLAN OF CARE
Goal Outcome Evaluation:        Assumed cares from Stefany Solomon at 1500  Data: Patient assessed in the [facility] for uterine contractions.  Cervical exam 3-4/50/-1. Repeated exam after 1 hr and no change. Membranes intact.  Contractions/uterine assessment irregular, spaced out while patient here.  Action:  Presumed adequate fetal oxygenation documented (see flow record). Discharge instructions reviewed.  Patient instructed to report change in fetal movement, vaginal leaking of fluid or bleeding, abdominal pain, or any concerns related to the pregnancy to her nurse/physician.    Response: Orders to discharge home per Stella Pacheco.  Patient verbalized understanding of education and verbalized agreement with plan. Discharged to home at 1615.   Education done of eating and staying hydrated. Also t empty your bladder frequently. Pt verbalizes undrstanding

## 2023-05-08 NOTE — CARE PLAN
Data: Patient presented to Birthplace: 2023  2:18 PM.  Reason for maternal/fetal assessment is uterine contractions. Patient reports having contractions last-night from 8pm-2am then fell asleep then woke up to contractions at 0640 with 3-4 contractions happening within the hour. Patient describes contractions as intermittent cramps that radiate to back. Rating pain 6/10. Lower  with palpation. Patient still completing abx treatment for UTI.  Patient is a .  Prenatal record reviewed. Pregnancy has been completed: hx  labor with PROM x 2. Gestational Age 36w5d. VSS. Fetal movement active. Patient denies leaking of vaginal fluid/rupture of membranes, vaginal bleeding, abdominal pain, nausea, vomiting, visual disturbances, epigastric or URQ pain, significant edema. Patient's daughter at bedside.   Action: Verbal consent for EFM. Triage assessment completed. Bill of rights reviewed.  Response: Patient verbalized agreement with plan. Will contact Dr. Stella Pacheco with update and for further orders.

## 2023-05-08 NOTE — DISCHARGE INSTRUCTIONS
Discharge Instruction for Undelivered Patients      You were seen for: Labor Assessment  We Consulted: Furuseth  You had (Test or Medicine):monitoring and repeat cervix checks, unchanged     Diet:   Drink 8 to 12 glasses of liquids (milk, juice, water) every day.  You may eat meals and snacks.     Activity:  Call your doctor or nurse midwife if your baby is moving less than usual.     Call your provider if you notice:  Swelling in your face or increased swelling in your hands or legs.  Headaches that are not relieved by Tylenol (acetaminophen).  Changes in your vision (blurring: seeing spots or stars.)  Nausea (sick to your stomach) and vomiting (throwing up).   Weight gain of 5 pounds or more per week.  Heartburn that doesn't go away.  Signs of bladder infection: pain when you urinate (use the toilet), need to go more often and more urgently.  The bag of nation (rupture of membranes) breaks, or you notice leaking in your underwear.  Bright red blood in your underwear.  Abdominal (lower belly) or stomach pain..  Second (plus) baby: Contractions every 10 mins for 1 hour at least and getting stronger  Increase or change in vaginal discharge (note the color and amount)  Other: .    Follow-up:  As scheduled in the clinic

## 2023-05-08 NOTE — PROVIDER NOTIFICATION
[id number]   Provider Notification   Provider Name/Title    Method of Notification Phone   Request Evaluate - Remote   Notification Reason Patient Arrived;Uterine Activity;Pain   Dr. Stella Pacheco informed of patient arrival and assessment including the following:    Reason for maternal/fetal assessment uterine contractions.  at 36.5 wks. Pt reports having contractions since last-night from 8p-2a occurring every 10-15 min apart, then fell asleep and woke up this morning at 0640 with contractions 3-4 times within the hour. Rating pain 6/10. Fetal status normal baseline, moderate variability, accelerations present and no decelerations. Contractions 6-10 min apart, mild with palpation. Plan per provider/orders received for outpatient orders, SVE and re-evaluate in 1 hour, if cervix is not changing then discharge home. Patient has follow up appointment tomorrow 23 with GIULIANO Little. Hand off report given to Ariel FRAUSTO.

## 2023-05-17 ENCOUNTER — HOSPITAL ENCOUNTER (INPATIENT)
Facility: CLINIC | Age: 33
LOS: 2 days | Discharge: HOME | End: 2023-05-19
Attending: OBSTETRICS & GYNECOLOGY | Admitting: OBSTETRICS & GYNECOLOGY
Payer: COMMERCIAL

## 2023-05-17 PROBLEM — Z34.90 PREGNANCY: Status: ACTIVE | Noted: 2023-05-17

## 2023-05-17 LAB
ABO/RH(D): NORMAL
ANTIBODY SCREEN: NEGATIVE
ERYTHROCYTE [DISTWIDTH] IN BLOOD BY AUTOMATED COUNT: 13.1 % (ref 10–15)
HCT VFR BLD AUTO: 39.6 % (ref 35–47)
HGB BLD-MCNC: 13 G/DL (ref 11.7–15.7)
MCH RBC QN AUTO: 27.7 PG (ref 26.5–33)
MCHC RBC AUTO-ENTMCNC: 32.8 G/DL (ref 31.5–36.5)
MCV RBC AUTO: 84 FL (ref 78–100)
PLATELET # BLD AUTO: 205 10E3/UL (ref 150–450)
RBC # BLD AUTO: 4.69 10E6/UL (ref 3.8–5.2)
SPECIMEN EXPIRATION DATE: NORMAL
WBC # BLD AUTO: 10.2 10E3/UL (ref 4–11)

## 2023-05-17 PROCEDURE — 722N000001 HC LABOR CARE VAGINAL DELIVERY SINGLE

## 2023-05-17 PROCEDURE — 250N000011 HC RX IP 250 OP 636: Performed by: OBSTETRICS & GYNECOLOGY

## 2023-05-17 PROCEDURE — 258N000003 HC RX IP 258 OP 636: Performed by: OBSTETRICS & GYNECOLOGY

## 2023-05-17 PROCEDURE — 250N000013 HC RX MED GY IP 250 OP 250 PS 637: Performed by: OBSTETRICS & GYNECOLOGY

## 2023-05-17 PROCEDURE — 10907ZC DRAINAGE OF AMNIOTIC FLUID, THERAPEUTIC FROM PRODUCTS OF CONCEPTION, VIA NATURAL OR ARTIFICIAL OPENING: ICD-10-PCS | Performed by: OBSTETRICS & GYNECOLOGY

## 2023-05-17 PROCEDURE — 120N000001 HC R&B MED SURG/OB

## 2023-05-17 PROCEDURE — 85027 COMPLETE CBC AUTOMATED: CPT | Performed by: OBSTETRICS & GYNECOLOGY

## 2023-05-17 PROCEDURE — 86780 TREPONEMA PALLIDUM: CPT | Performed by: OBSTETRICS & GYNECOLOGY

## 2023-05-17 PROCEDURE — 86850 RBC ANTIBODY SCREEN: CPT | Performed by: OBSTETRICS & GYNECOLOGY

## 2023-05-17 PROCEDURE — 250N000009 HC RX 250

## 2023-05-17 RX ORDER — OXYTOCIN/0.9 % SODIUM CHLORIDE 30/500 ML
100-340 PLASTIC BAG, INJECTION (ML) INTRAVENOUS CONTINUOUS PRN
Status: DISCONTINUED | OUTPATIENT
Start: 2023-05-17 | End: 2023-05-17

## 2023-05-17 RX ORDER — ACETAMINOPHEN 325 MG/1
650 TABLET ORAL EVERY 4 HOURS PRN
Status: DISCONTINUED | OUTPATIENT
Start: 2023-05-17 | End: 2023-05-19 | Stop reason: HOSPADM

## 2023-05-17 RX ORDER — OXYTOCIN/0.9 % SODIUM CHLORIDE 30/500 ML
340 PLASTIC BAG, INJECTION (ML) INTRAVENOUS CONTINUOUS PRN
Status: DISCONTINUED | OUTPATIENT
Start: 2023-05-17 | End: 2023-05-17 | Stop reason: HOSPADM

## 2023-05-17 RX ORDER — CARBOPROST TROMETHAMINE 250 UG/ML
250 INJECTION, SOLUTION INTRAMUSCULAR
Status: DISCONTINUED | OUTPATIENT
Start: 2023-05-17 | End: 2023-05-17 | Stop reason: HOSPADM

## 2023-05-17 RX ORDER — CEPHALEXIN 500 MG/1
500 CAPSULE ORAL 3 TIMES DAILY
Status: DISCONTINUED | OUTPATIENT
Start: 2023-05-17 | End: 2023-05-19 | Stop reason: HOSPADM

## 2023-05-17 RX ORDER — MODIFIED LANOLIN
OINTMENT (GRAM) TOPICAL
Qty: 7 G | Refills: 3 | Status: SHIPPED | OUTPATIENT
Start: 2023-05-17

## 2023-05-17 RX ORDER — HYDROCORTISONE 25 MG/G
CREAM TOPICAL 3 TIMES DAILY PRN
Status: DISCONTINUED | OUTPATIENT
Start: 2023-05-17 | End: 2023-05-19 | Stop reason: HOSPADM

## 2023-05-17 RX ORDER — OXYTOCIN 10 [USP'U]/ML
10 INJECTION, SOLUTION INTRAMUSCULAR; INTRAVENOUS
Status: DISCONTINUED | OUTPATIENT
Start: 2023-05-17 | End: 2023-05-17

## 2023-05-17 RX ORDER — MISOPROSTOL 200 UG/1
400 TABLET ORAL
Status: DISCONTINUED | OUTPATIENT
Start: 2023-05-17 | End: 2023-05-17 | Stop reason: HOSPADM

## 2023-05-17 RX ORDER — MISOPROSTOL 200 UG/1
800 TABLET ORAL
Status: DISCONTINUED | OUTPATIENT
Start: 2023-05-17 | End: 2023-05-19 | Stop reason: HOSPADM

## 2023-05-17 RX ORDER — METOCLOPRAMIDE 10 MG/1
10 TABLET ORAL EVERY 6 HOURS PRN
Status: DISCONTINUED | OUTPATIENT
Start: 2023-05-17 | End: 2023-05-17 | Stop reason: HOSPADM

## 2023-05-17 RX ORDER — SODIUM CHLORIDE, SODIUM LACTATE, POTASSIUM CHLORIDE, CALCIUM CHLORIDE 600; 310; 30; 20 MG/100ML; MG/100ML; MG/100ML; MG/100ML
INJECTION, SOLUTION INTRAVENOUS CONTINUOUS
Status: DISCONTINUED | OUTPATIENT
Start: 2023-05-17 | End: 2023-05-17 | Stop reason: HOSPADM

## 2023-05-17 RX ORDER — ONDANSETRON 4 MG/1
4 TABLET, ORALLY DISINTEGRATING ORAL EVERY 6 HOURS PRN
Status: DISCONTINUED | OUTPATIENT
Start: 2023-05-17 | End: 2023-05-17 | Stop reason: HOSPADM

## 2023-05-17 RX ORDER — OXYTOCIN 10 [USP'U]/ML
10 INJECTION, SOLUTION INTRAMUSCULAR; INTRAVENOUS
Status: DISCONTINUED | OUTPATIENT
Start: 2023-05-17 | End: 2023-05-19 | Stop reason: HOSPADM

## 2023-05-17 RX ORDER — TRANEXAMIC ACID 10 MG/ML
1 INJECTION, SOLUTION INTRAVENOUS EVERY 30 MIN PRN
Status: DISCONTINUED | OUTPATIENT
Start: 2023-05-17 | End: 2023-05-19 | Stop reason: HOSPADM

## 2023-05-17 RX ORDER — MISOPROSTOL 200 UG/1
800 TABLET ORAL
Status: DISCONTINUED | OUTPATIENT
Start: 2023-05-17 | End: 2023-05-17 | Stop reason: HOSPADM

## 2023-05-17 RX ORDER — METHYLERGONOVINE MALEATE 0.2 MG/ML
200 INJECTION INTRAVENOUS
Status: DISCONTINUED | OUTPATIENT
Start: 2023-05-17 | End: 2023-05-19 | Stop reason: HOSPADM

## 2023-05-17 RX ORDER — PENICILLIN G 3000000 [IU]/50ML
3 INJECTION, SOLUTION INTRAVENOUS EVERY 4 HOURS
Status: DISCONTINUED | OUTPATIENT
Start: 2023-05-17 | End: 2023-05-17 | Stop reason: HOSPADM

## 2023-05-17 RX ORDER — DOCUSATE SODIUM 100 MG/1
100 CAPSULE, LIQUID FILLED ORAL DAILY
Qty: 30 CAPSULE | Refills: 0 | Status: SHIPPED | OUTPATIENT
Start: 2023-05-17

## 2023-05-17 RX ORDER — BISACODYL 10 MG
10 SUPPOSITORY, RECTAL RECTAL DAILY PRN
Status: DISCONTINUED | OUTPATIENT
Start: 2023-05-17 | End: 2023-05-19 | Stop reason: HOSPADM

## 2023-05-17 RX ORDER — IBUPROFEN 800 MG/1
800 TABLET, FILM COATED ORAL
Status: DISCONTINUED | OUTPATIENT
Start: 2023-05-17 | End: 2023-05-17

## 2023-05-17 RX ORDER — IBUPROFEN 800 MG/1
800 TABLET, FILM COATED ORAL EVERY 6 HOURS PRN
Qty: 40 TABLET | Refills: 1 | Status: SHIPPED | OUTPATIENT
Start: 2023-05-17

## 2023-05-17 RX ORDER — METHYLERGONOVINE MALEATE 0.2 MG/ML
200 INJECTION INTRAVENOUS
Status: DISCONTINUED | OUTPATIENT
Start: 2023-05-17 | End: 2023-05-17 | Stop reason: HOSPADM

## 2023-05-17 RX ORDER — KETOROLAC TROMETHAMINE 30 MG/ML
30 INJECTION, SOLUTION INTRAMUSCULAR; INTRAVENOUS
Status: DISCONTINUED | OUTPATIENT
Start: 2023-05-17 | End: 2023-05-17

## 2023-05-17 RX ORDER — FENTANYL CITRATE 50 UG/ML
100 INJECTION, SOLUTION INTRAMUSCULAR; INTRAVENOUS
Status: DISCONTINUED | OUTPATIENT
Start: 2023-05-17 | End: 2023-05-17 | Stop reason: HOSPADM

## 2023-05-17 RX ORDER — IBUPROFEN 800 MG/1
800 TABLET, FILM COATED ORAL EVERY 6 HOURS PRN
Status: DISCONTINUED | OUTPATIENT
Start: 2023-05-17 | End: 2023-05-19 | Stop reason: HOSPADM

## 2023-05-17 RX ORDER — METOCLOPRAMIDE HYDROCHLORIDE 5 MG/ML
10 INJECTION INTRAMUSCULAR; INTRAVENOUS EVERY 6 HOURS PRN
Status: DISCONTINUED | OUTPATIENT
Start: 2023-05-17 | End: 2023-05-17 | Stop reason: HOSPADM

## 2023-05-17 RX ORDER — MISOPROSTOL 200 UG/1
400 TABLET ORAL
Status: DISCONTINUED | OUTPATIENT
Start: 2023-05-17 | End: 2023-05-19 | Stop reason: HOSPADM

## 2023-05-17 RX ORDER — ACETAMINOPHEN 325 MG/1
650 TABLET ORAL EVERY 4 HOURS PRN
Status: DISCONTINUED | OUTPATIENT
Start: 2023-05-17 | End: 2023-05-17 | Stop reason: HOSPADM

## 2023-05-17 RX ORDER — NALOXONE HYDROCHLORIDE 0.4 MG/ML
0.2 INJECTION, SOLUTION INTRAMUSCULAR; INTRAVENOUS; SUBCUTANEOUS
Status: DISCONTINUED | OUTPATIENT
Start: 2023-05-17 | End: 2023-05-17 | Stop reason: HOSPADM

## 2023-05-17 RX ORDER — NALOXONE HYDROCHLORIDE 0.4 MG/ML
0.4 INJECTION, SOLUTION INTRAMUSCULAR; INTRAVENOUS; SUBCUTANEOUS
Status: DISCONTINUED | OUTPATIENT
Start: 2023-05-17 | End: 2023-05-17 | Stop reason: HOSPADM

## 2023-05-17 RX ORDER — DOCUSATE SODIUM 100 MG/1
100 CAPSULE, LIQUID FILLED ORAL DAILY
Status: DISCONTINUED | OUTPATIENT
Start: 2023-05-17 | End: 2023-05-19 | Stop reason: HOSPADM

## 2023-05-17 RX ORDER — OXYTOCIN/0.9 % SODIUM CHLORIDE 30/500 ML
PLASTIC BAG, INJECTION (ML) INTRAVENOUS
Status: COMPLETED
Start: 2023-05-17 | End: 2023-05-17

## 2023-05-17 RX ORDER — PROCHLORPERAZINE 25 MG
25 SUPPOSITORY, RECTAL RECTAL EVERY 12 HOURS PRN
Status: DISCONTINUED | OUTPATIENT
Start: 2023-05-17 | End: 2023-05-17 | Stop reason: HOSPADM

## 2023-05-17 RX ORDER — ONDANSETRON 2 MG/ML
4 INJECTION INTRAMUSCULAR; INTRAVENOUS EVERY 6 HOURS PRN
Status: DISCONTINUED | OUTPATIENT
Start: 2023-05-17 | End: 2023-05-17 | Stop reason: HOSPADM

## 2023-05-17 RX ORDER — CARBOPROST TROMETHAMINE 250 UG/ML
250 INJECTION, SOLUTION INTRAMUSCULAR
Status: DISCONTINUED | OUTPATIENT
Start: 2023-05-17 | End: 2023-05-19 | Stop reason: HOSPADM

## 2023-05-17 RX ORDER — LIDOCAINE HYDROCHLORIDE 10 MG/ML
INJECTION, SOLUTION EPIDURAL; INFILTRATION; INTRACAUDAL; PERINEURAL
Status: DISCONTINUED
Start: 2023-05-17 | End: 2023-05-17 | Stop reason: WASHOUT

## 2023-05-17 RX ORDER — PENICILLIN G POTASSIUM 5000000 [IU]/1
5 INJECTION, POWDER, FOR SOLUTION INTRAMUSCULAR; INTRAVENOUS ONCE
Status: COMPLETED | OUTPATIENT
Start: 2023-05-17 | End: 2023-05-17

## 2023-05-17 RX ORDER — OXYTOCIN/0.9 % SODIUM CHLORIDE 30/500 ML
340 PLASTIC BAG, INJECTION (ML) INTRAVENOUS CONTINUOUS PRN
Status: DISCONTINUED | OUTPATIENT
Start: 2023-05-17 | End: 2023-05-19 | Stop reason: HOSPADM

## 2023-05-17 RX ORDER — CITRIC ACID/SODIUM CITRATE 334-500MG
30 SOLUTION, ORAL ORAL
Status: DISCONTINUED | OUTPATIENT
Start: 2023-05-17 | End: 2023-05-17 | Stop reason: HOSPADM

## 2023-05-17 RX ORDER — TRANEXAMIC ACID 10 MG/ML
1 INJECTION, SOLUTION INTRAVENOUS EVERY 30 MIN PRN
Status: DISCONTINUED | OUTPATIENT
Start: 2023-05-17 | End: 2023-05-17 | Stop reason: HOSPADM

## 2023-05-17 RX ORDER — PROCHLORPERAZINE MALEATE 10 MG
10 TABLET ORAL EVERY 6 HOURS PRN
Status: DISCONTINUED | OUTPATIENT
Start: 2023-05-17 | End: 2023-05-17 | Stop reason: HOSPADM

## 2023-05-17 RX ORDER — MODIFIED LANOLIN
OINTMENT (GRAM) TOPICAL
Status: DISCONTINUED | OUTPATIENT
Start: 2023-05-17 | End: 2023-05-19 | Stop reason: HOSPADM

## 2023-05-17 RX ORDER — OXYTOCIN 10 [USP'U]/ML
10 INJECTION, SOLUTION INTRAMUSCULAR; INTRAVENOUS
Status: DISCONTINUED | OUTPATIENT
Start: 2023-05-17 | End: 2023-05-17 | Stop reason: HOSPADM

## 2023-05-17 RX ORDER — HYDROCORTISONE 25 MG/G
CREAM TOPICAL 3 TIMES DAILY PRN
Qty: 30 G | Refills: 0 | Status: SHIPPED | OUTPATIENT
Start: 2023-05-17

## 2023-05-17 RX ADMIN — PENICILLIN G POTASSIUM 5 MILLION UNITS: 5000000 POWDER, FOR SOLUTION INTRAMUSCULAR; INTRAPLEURAL; INTRATHECAL; INTRAVENOUS at 16:10

## 2023-05-17 RX ADMIN — Medication 340 ML/HR: at 18:15

## 2023-05-17 RX ADMIN — SODIUM CHLORIDE, POTASSIUM CHLORIDE, SODIUM LACTATE AND CALCIUM CHLORIDE: 600; 310; 30; 20 INJECTION, SOLUTION INTRAVENOUS at 16:10

## 2023-05-17 RX ADMIN — KETOROLAC TROMETHAMINE 30 MG: 30 INJECTION, SOLUTION INTRAMUSCULAR; INTRAVENOUS at 18:31

## 2023-05-17 RX ADMIN — CEPHALEXIN 500 MG: 500 CAPSULE ORAL at 22:28

## 2023-05-17 ASSESSMENT — ACTIVITIES OF DAILY LIVING (ADL)
ADLS_ACUITY_SCORE: 18

## 2023-05-17 NOTE — L&D DELIVERY NOTE
Tatyana Amador is a 33 year old  who was admitted at 1545 23. She presented to triage, and was found to be laboring. She was noted to be 6-7 cm dilated. This pregnancy was complicated by hx ptl/ptb, advanced dilation, covid in second TM. GBS +, Rh +, Covid recovered. She was admitted to Labor and Delivery. Labor progressed, and nitrous was administered. Pitocin augmentation was not begun, as contractions were adequate. Rupture of membranes was artificial, and clear fluid was noted. She progressed to complete. She pushed effectively, for approximately 5 minutes. At 1813, she experienced  of a vigorous female , from an RAJEEV position, over an intact perineum. Repair was not needed.  APGARS 8/8. Pitocin was begun after delivery of the baby. The cord was cut at 120+ seconds, as it had ceased pulsing. A three vessel cord was noted. The placenta delivered spontaneously, and found to appear intact on inspection. QBL 100mL.    Stella Pacheco MD on 2023 at 6:36 PM

## 2023-05-17 NOTE — H&P
"  May 17, 2023    Tatyana Amador  [id number]            OB Admit History & Physical      Ms. Amador  is here in labor.    She has noticed increasing contractions this afternoon, and is found to be 6-7cm on arrival.     No LMP recorded. Patient is pregnant.   Her Estimated Date of Delivery: May 31, 2023  , making her 38w0d  wks.      Estimated body mass index is 29.29 kg/m  as calculated from the following:    Height as of 23: 1.651 m (5' 5\").    Weight as of 23: 79.8 kg (176 lb).  Her prenatal course has been complicated by advanced dilation, hx PTL/PTB at 27 weeks, covid in pregnancy ().    See prenatal for labs.  positive GBBS, Rubella Immune, RH O+    Estimated fetal weight= 2800gm       She is a 33 year old   Her OB history:   OB History    Para Term  AB Living   7 3 2 1 3 3   SAB IAB Ectopic Multiple Live Births   2 1 0 0 3      # Outcome Date GA Lbr Damian/2nd Weight Sex Delivery Anes PTL Lv   7 Current            6 Term 19    F Vag-Spont   MANJU      Name: Nilae   5  12 27w0d   M Vag-Spont  Y MANJU      Name: Tre   4 Term 08     Vag-Spont   MANJU      Name: Harmany   3 SAB            2 IAB            1 SAB                   No past medical history on file.     No past surgical history on file.      No current outpatient medications on file.       Allergies: Latex      REVIEW OF SYSTEMS:  NEUROLOGIC:  Negative  EYES:  Negative  ENT:  Negative  GI:  Negative  BREAST:  Negative  :  Negative  GYN:  Negative  CV:  Negative  PULMONARY:  Negative  MUSCULOSKELETAL:  Negative  PSYCH:  Negative        Social History     Socioeconomic History     Marital status: Single     Spouse name: Not on file     Number of children: Not on file     Years of education: Not on file     Highest education level: Not on file   Occupational History     Not on file   Tobacco Use     Smoking status: Never     Smokeless tobacco: Never   Vaping Use     Vaping status: Not on file   Substance and Sexual " Activity     Alcohol use: Not Currently     Drug use: Not Currently     Types: Marijuana     Comment: stopped smoking marijuana w/+ pregnancy test     Sexual activity: Not Currently     Partners: Male   Other Topics Concern     Not on file   Social History Narrative     Not on file     Social Determinants of Health     Financial Resource Strain: Not on file   Food Insecurity: Not on file   Transportation Needs: Not on file   Physical Activity: Not on file   Stress: Not on file   Social Connections: Not on file   Intimate Partner Violence: Not on file   Housing Stability: Not on file      No family history on file.      Vitals:   FHT category 1  With contractions every  7-8 min    Alert Awake in NAD  HEENT grossly normal  Neck: no lymphadenopathy or thryoidomegaly  Lungs CTAB  Back no spinal or CVAT  Heart RRR  ABD gravid, nontender on exam with vertex palpable  Pelvic:  no fluid noted, no blood noted  Cervix is 6-7 cm / 75 % effaced at -2 station  EXT:  no edema or calf tenderness  Neuro:  Grossly intact    Assessment/Plan: Ms. Tatyana Amador is a 34 y/o  with SIUP 38w0, admitted for spontaneous term labor    Prenatal Care:  New OB labs: Rh pos, HIV/RPR neg, HepB Ag NR, Rubella immune, Hep C Ab neg  - Genetic screening: NIPS normal  - Anatomy ultrasound 23: incomplete and dynamic cervix with shortest measurement 9.2 mm  - 2nd trimester labs: failed GCT, passed GTT, rest wnl  - Tdap declines, s/p COVID x1, flu declines  - GBS positive (PCN per protocol)  - BC: Mirena (she has previously used)  - Feed: breast; has Rx  - Continue taking prenatal vitamins    H/o PTB  -Short cervix with funneling @ L2  -Premature cervical dilation  - d/c'd progesterone at 36 wks  -has been 4cm    COVID infection  - In 2nd tri, + on   - HELLP 3/7/23- wnl  - Beyond window for ASA benefit  - Hillary  EFW 16%, FL 3%, follow-up today 12%, AC 17%, currently awaiting call from [facility] to see if visit is needed next week.     Migraine  -  controlled with Tylenol and caffeine    Hx CARI  -admission labs pending    Stella Pacheco MD  Dept of OB/GYN  May 17, 2023

## 2023-05-17 NOTE — PROVIDER NOTIFICATION
05/17/23 1555   Provider Notification   Provider Name/Title Dr. Pacheco   Method of Notification At Bedside   Request Evaluate in Person   Notification Reason Patient Arrived;DOREEN PIERRE and RN at bedside. FHR tracing category 1, ctx occurring Q4-6 and palpate strong. Membranes palpate intact, no signs of ROM.     Order received to admit patient for active labor and begin IV penicillin for GBS+ status. If FHR tracing remains category 1, patient ok to be off monitor to ambulate. RN ok to use IA as indicated. MD will remain on unit for delivery.

## 2023-05-17 NOTE — DISCHARGE SUMMARY
[facility] Discharge Summary    Tatyana Amador MRN# [id number]   Age: 33 year old YOB: 1990     Date of Admission:  2023  Date of Discharge::  2023  Admitting Physician:  Stella Pacheco MD  Discharge Physician:  [facility] clinic: [facility]          Admission Diagnoses:   Pregnancy [Z34.90]  Spontaneous term labor  Hx PTL/PTB at 27 weeks with prior pregnancy  GBS +  covid in second trimester          Discharge Diagnosis:   Normal spontaneous vaginal delivery  Intrauterine pregnancy at 38 weeks gestation  GBS inadequately treated          Procedures:   Procedure(s):        No other procedures performed during this admission           Medications Prior to Admission:     Medications Prior to Admission   Medication Sig Dispense Refill Last Dose     cephALEXin (KEFLEX) 500 MG capsule Take 500 mg by mouth 3 times daily   More than a month     Prenatal MV-Min-Fe Fum-FA-DHA (PRENATAL 1 PO) Take 1 tablet by mouth daily   Past Week             Discharge Medications:     Current Discharge Medication List      START taking these medications    Details   docusate sodium (COLACE) 100 MG capsule Take 1 capsule (100 mg) by mouth daily  Qty: 30 capsule, Refills: 0    Associated Diagnoses: Indication for care in labor or delivery      hydrocortisone, Perianal, (ANUSOL-HC) 2.5 % cream Place rectally 3 times daily as needed for hemorrhoids  Qty: 30 g, Refills: 0    Associated Diagnoses: Indication for care in labor or delivery      ibuprofen (ADVIL/MOTRIN) 800 MG tablet Take 1 tablet (800 mg) by mouth every 6 hours as needed for other (cramping)  Qty: 40 tablet, Refills: 1    Associated Diagnoses: Indication for care in labor or delivery      lanolin ointment Apply topically every hour as needed for other (sore nipples)  Qty: 7 g, Refills: 3    Associated Diagnoses: Indication for care in labor or delivery         CONTINUE these medications which have NOT CHANGED     Details   cephALEXin (KEFLEX) 500 MG capsule Take 500 mg by mouth 3 times daily      Prenatal MV-Min-Fe Fum-FA-DHA (PRENATAL 1 PO) Take 1 tablet by mouth daily                   Consultations:   No consultations were requested during this admission          Brief History of Labor:   Tatyana Amador is a 33 year old  who was admitted at 1545 23. She presented to triage, and was found to be laboring. She was noted to be 6-7 cm dilated. This pregnancy was complicated by hx ptl/ptb, advanced dilation, covid in second TM. GBS +, Rh +, Covid recovered. She was admitted to Labor and Delivery. Labor progressed, and nitrous was administered. Pitocin augmentation was not begun, as contractions were adequate. Rupture of membranes was artificial, and clear fluid was noted. She progressed to complete. She pushed effectively, for approximately 5 minutes. At 1813, she experienced  of a vigorous female , from an RAJEEV position, over an intact perineum. Repair was not needed.  APGARS 8/8. Pitocin was begun after delivery of the baby. The cord was cut at 120+ seconds, as it had ceased pulsing. A three vessel cord was noted. The placenta delivered spontaneously, and found to appear intact on inspection. QBL 100mL.           Hospital Course:   The patient's hospital course was unremarkable.  On discharge, her pain was well controlled. Vaginal bleeding is similar to peak menstrual flow.  Voiding without difficulty.  Ambulating well and tolerating a normal diet.  No fever.  Breastfeeding well.  Infant is stable.  No bowel movement yet.*  She was discharged on post-partum day #2.    Post-partum hemoglobin:   Hemoglobin   Date Value Ref Range Status   2023 13.0 11.7 - 15.7 g/dL Final             Discharge Instructions and Follow-Up:   Discharge diet: Regular   Discharge activity: No driving or operating machinery while on narcotic analgesics  Pelvic rest: abstain from intercourse and do not use tampons for 6 week(s)    Discharge follow-up: Follow up with primary care provider in 6 weeks   Wound care: Drink plenty of fluids  Ice to area for comfort           Discharge Disposition:   Discharged to home

## 2023-05-17 NOTE — PLAN OF CARE
Data: Patient presented to [facility]: 2023  3:34 PM.  Reason for maternal/fetal assessment is uterine contractions. Patient reports irregular ctx that began last night and became strong and regular around 1300 today.  Patient is a .  Prenatal record reviewed. Pregnancy  has been complicated by hx of pre-term delivery, PTL treated with nifedipine and betamethasone x2 in 2nd trimester, reported use of THC prior to learning of pregnancy.  Gestational Age 38w0d. VSS. Fetal movement present. Patient denies leaking of vaginal fluid/rupture of membranes, vaginal bleeding, abdominal pain, nausea, vomiting, headache, visual disturbances, epigastric or RUQ pain, significant edema. Support person is present.   Action: Verbal consent for EFM. Triage assessment completed. Bill of rights reviewed. SVE in MAC: 0 with BBLEO.   Response: Patient verbalized agreement with plan. Dr Stella Pacheco called to unit to assess in person and enter orders.

## 2023-05-18 LAB
HGB BLD-MCNC: 12.3 G/DL (ref 11.7–15.7)
T PALLIDUM AB SER QL: NONREACTIVE

## 2023-05-18 PROCEDURE — 999N000080 HC STATISTIC IP LACTATION SERVICES 16-30 MIN

## 2023-05-18 PROCEDURE — 85018 HEMOGLOBIN: CPT | Performed by: OBSTETRICS & GYNECOLOGY

## 2023-05-18 PROCEDURE — 36415 COLL VENOUS BLD VENIPUNCTURE: CPT | Performed by: OBSTETRICS & GYNECOLOGY

## 2023-05-18 PROCEDURE — 250N000013 HC RX MED GY IP 250 OP 250 PS 637: Performed by: OBSTETRICS & GYNECOLOGY

## 2023-05-18 PROCEDURE — 120N000001 HC R&B MED SURG/OB

## 2023-05-18 RX ADMIN — IBUPROFEN 800 MG: 800 TABLET ORAL at 19:09

## 2023-05-18 RX ADMIN — ACETAMINOPHEN 650 MG: 325 TABLET ORAL at 00:50

## 2023-05-18 RX ADMIN — IBUPROFEN 800 MG: 800 TABLET ORAL at 13:07

## 2023-05-18 RX ADMIN — ACETAMINOPHEN 650 MG: 325 TABLET ORAL at 19:09

## 2023-05-18 RX ADMIN — IBUPROFEN 800 MG: 800 TABLET ORAL at 06:57

## 2023-05-18 RX ADMIN — CEPHALEXIN 500 MG: 500 CAPSULE ORAL at 10:53

## 2023-05-18 RX ADMIN — ACETAMINOPHEN 650 MG: 325 TABLET ORAL at 06:57

## 2023-05-18 RX ADMIN — ACETAMINOPHEN 650 MG: 325 TABLET ORAL at 13:06

## 2023-05-18 RX ADMIN — CEPHALEXIN 500 MG: 500 CAPSULE ORAL at 19:09

## 2023-05-18 RX ADMIN — IBUPROFEN 800 MG: 800 TABLET ORAL at 00:50

## 2023-05-18 ASSESSMENT — ACTIVITIES OF DAILY LIVING (ADL)
ADLS_ACUITY_SCORE: 18

## 2023-05-18 NOTE — PLAN OF CARE
Vitals remained stable. Postpartum checks within normal limits. Ambulating independently. Voiding spontaneously. Pain controlled with tylenol and ibuprofen. Breastfeeding every 2-3 hours. Bonding well with baby.

## 2023-05-18 NOTE — PLAN OF CARE
Data: Tatyana Amador transferred to 225 via wheelchair at 2035. Baby transferred via parent's arms.  Action: Receiving unit notified of transfer: Yes. Patient and family notified of room change. Report given to JETT Morales at 2035. Belongings sent to receiving unit. Accompanied by Registered Nurse. Oriented patient to surroundings. Call light within reach. ID bands double-checked with receiving RN.  Response: Patient tolerated transfer and is stable.

## 2023-05-18 NOTE — PROVIDER NOTIFICATION
05/17/23 1744   Membranes   Membrane Status Ruptured    Sac Identifier Sac 1   Rupture Type AROM   Rupture Date 05/17/23   Rupture Time 1744   Amniotic Fluid Color Clear   Amniotic Fluid Odor Normal   Amniotic Fluid Amount Small   MD at bedside for AROM. Patient using nitrous oxide for pain management, coping well with labor. SVE 9/100/+1, patient feeling strong urge to push. Room prepped and ready for delivery.

## 2023-05-18 NOTE — LACTATION NOTE
Lactation support visit with Tatyana. Baby at breast on right side in football hold. Mother has large, nipples baby does well getting around. Baby did slip off of breast, possibly due to positioning. Switched infant to cross cradle. Baby latched deep on and with compressions moved to a nutritive suck pattern with good swallows seen and pointed out to mother. Patient states she nursed her other with good supply. Has been pumping for a week. Breast felt firm. Encouraged frequent feeds. All questions answered. Knows lactation available as needed.

## 2023-05-18 NOTE — PLAN OF CARE
Goal Outcome Evaluation:      Plan of Care Reviewed With: patient    Overall Patient Progress: improvingOverall Patient Progress: improving    Outcome Evaluation: Ready to discharge with infant on 5/19/23. Birth certificate completed- in room with ROP papers. Need Notary. Postpartum Depression score- 6.    Data: Vital signs within normal limits. Postpartum checks within normal limits - see flow record. Patient eating and drinking normally. Patient able to empty bladder independently and is up ambulating. No apparent signs of infection. Patient is using a T-pump for comfort, having after cramping a little more than desired. Using Ibuprofen and Tylenol for pain control. Patient performing self cares and is able to care for infant.  Action: Patient medicated during the shift for pain and cramping. See MAR. Patient reassessed within 1 hour after each medication and pain was improved - patient stated she was comfortable. Patient education done about infant feeding schedules, pain medication schedules, and infant 24 hour testing. See flow record.  Response: Positive attachment behaviors observed with infant. Significant other at bedside a few different times today and brought in other children to see new baby. Parents bonding well with infant and excited to bring home their new daughter.  Plan: Anticipate discharge on 5/19/23

## 2023-05-18 NOTE — CONSULTS
INITIAL SOCIAL WORK MATERNITY ASSESSMENT     DATA:      Reason for Social Work Consult: THC use prior to learning of pregnancy     Presenting Information: SW met with MOB at bedside to introduce role and offer support. MOB states that she is doing well and has all of the necessary supports and supplies at home. MOB denies SW needs at this time.     Living Situation: MOB lives at home and reports that she has 4 other children at home.     Social Support/Professional Community Support:  MOB reports that they have a strong support network and many helping hands.    Insurance: [facility]    Baby Supplies: MOB reports that they have all necessary baby supplies and denies any needs. MOB reports that she has already applied for WIC and has all necessary information.     Mental Health History: MOB reports no hx of mental health     History of Postpartum Mood Disorders: None. Provided information on PPD and support networks.     Chemical Health History: Hx of THC use, denies use in pregnancy. Denies other drug use. Baby tox screen pending.         INTERVENTION:        ISS[facility] completed chart review and collaborated with the multidisciplinary team.     Psychosocial Assessment     Introduction to Maternal Child Health  role and scope of practice     Reviewed Hospital and Community Resources     Assessed Chemical Health History and Current Symptoms     Assessed Mental Health History and Current Symptoms     Identified stressors, barriers and family concerns     Provided support and active empathetic listening and validation.     Provided psychoeducation on  mood and anxiety disorders, assessed for any current symptoms or history    ASSESSMENT:      Appears MOB is doing well and has necessary supports at home. MOB bonding with baby during visit.      PLAN:       SW provided information to MOB and remains available for the duration of MOB hospital stay. SW will follow for baby tox results.     JOHANNA Alfaro,  [facility]  Inpatient Care Coordination  Emergency Room /[place]  [phone]

## 2023-05-18 NOTE — PROGRESS NOTES
[facility] OB Postpartum Note    S:  Tatyana Amador feels good this morning. Was able to sleep last night. Pain control adequate. Lochia minimal. Voiding. Breast feeding. Mood Good.     O:  Vitals were reviewed  Blood pressure 109/68, pulse 79, temperature 98.2  F (36.8  C), temperature source Oral, resp. rate 14, unknown if currently breastfeeding.      General: healthy, alert and no distress  Abd: soft, appropriately tender, fundus firm  Legs: Non-tender, 0+ pitting edema    No results found for: RH       Assessment and Plan:   Postpartum Day #1, status post vaginal delivery, doing well.  -- Routine care  -- Contraception: mirena  --GBS + but less than 4 hours of treatment. So discharged tomorrow     hgb 12.3 today     Jackie Peters, , DO

## 2023-05-19 VITALS
SYSTOLIC BLOOD PRESSURE: 111 MMHG | RESPIRATION RATE: 16 BRPM | HEART RATE: 77 BPM | DIASTOLIC BLOOD PRESSURE: 65 MMHG | TEMPERATURE: 97.4 F

## 2023-05-19 PROCEDURE — 999N000079 HC STATISTIC IP LACTATION SERVICES 1-15 MIN

## 2023-05-19 PROCEDURE — 250N000013 HC RX MED GY IP 250 OP 250 PS 637: Performed by: OBSTETRICS & GYNECOLOGY

## 2023-05-19 RX ADMIN — ACETAMINOPHEN 650 MG: 325 TABLET ORAL at 06:37

## 2023-05-19 RX ADMIN — IBUPROFEN 800 MG: 800 TABLET ORAL at 00:54

## 2023-05-19 RX ADMIN — ACETAMINOPHEN 650 MG: 325 TABLET ORAL at 00:54

## 2023-05-19 RX ADMIN — CEPHALEXIN 500 MG: 500 CAPSULE ORAL at 08:48

## 2023-05-19 RX ADMIN — IBUPROFEN 800 MG: 800 TABLET ORAL at 06:37

## 2023-05-19 RX ADMIN — CEPHALEXIN 500 MG: 500 CAPSULE ORAL at 03:28

## 2023-05-19 ASSESSMENT — ACTIVITIES OF DAILY LIVING (ADL)
ADLS_ACUITY_SCORE: 18

## 2023-05-19 NOTE — PLAN OF CARE
VSS, pt breast feeding well, denied pharmacological intervention so far, filled Rx's given and explained; follow up explained in 6 weeks with OB doctor, all question answered, discharging in stable condition with baby.

## 2023-05-19 NOTE — PLAN OF CARE
VSS. Up ad fabián. Voiding without difficulty. Lochia scant, no clots. Fundus firm and midline. Pain well managed with Tylenol and Ibuprofen. Breastfeeding, tolerating well. Bonding well with infant.

## 2023-05-19 NOTE — PROGRESS NOTES
Patient comfotable. Tolerating regular diet. Urinating ok. Bleeding moderate/minimal. Pain under control with ibuprofen and tylenol    Breast feeding.     /65 (BP Location: Left arm, Patient Position: Supine, Cuff Size: Adult Regular)   Pulse 77   Temp 97.4  F (36.3  C) (Oral)   Resp 16   Breastfeeding Unknown   Gen: well female NAD  FF: umb - 1  Ext: NT no cords    A/P  Post partum day #2  Doing well  Routine post partum cares  Anticipate discharge today    Raina Jones MD on 5/19/2023 at 9:13 AM

## 2023-05-19 NOTE — DISCHARGE INSTRUCTIONS

## 2023-05-19 NOTE — LACTATION NOTE
Lactation in to see patient. Areya states infant continues to nurse well. Has a pump for home today. No questions or concerns.

## 2024-03-09 ENCOUNTER — HEALTH MAINTENANCE LETTER (OUTPATIENT)
Age: 34
End: 2024-03-09

## 2025-03-16 ENCOUNTER — HEALTH MAINTENANCE LETTER (OUTPATIENT)
Age: 35
End: 2025-03-16

## 2025-08-18 ENCOUNTER — HOSPITAL ENCOUNTER (EMERGENCY)
Facility: CLINIC | Age: 35
Discharge: HOME | End: 2025-08-18
Attending: STUDENT IN AN ORGANIZED HEALTH CARE EDUCATION/TRAINING PROGRAM
Payer: COMMERCIAL

## 2025-08-18 VITALS
BODY MASS INDEX: 26.6 KG/M2 | SYSTOLIC BLOOD PRESSURE: 105 MMHG | DIASTOLIC BLOOD PRESSURE: 69 MMHG | WEIGHT: 159.83 LBS | OXYGEN SATURATION: 98 % | HEART RATE: 100 BPM | RESPIRATION RATE: 16 BRPM

## 2025-08-18 DIAGNOSIS — T78.40XA ALLERGIC REACTION, INITIAL ENCOUNTER: Primary | ICD-10-CM

## 2025-08-18 PROCEDURE — 250N000009 HC RX 250: Performed by: STUDENT IN AN ORGANIZED HEALTH CARE EDUCATION/TRAINING PROGRAM

## 2025-08-18 PROCEDURE — 99285 EMERGENCY DEPT VISIT HI MDM: CPT | Mod: 25 | Performed by: STUDENT IN AN ORGANIZED HEALTH CARE EDUCATION/TRAINING PROGRAM

## 2025-08-18 PROCEDURE — 250N000011 HC RX IP 250 OP 636: Performed by: STUDENT IN AN ORGANIZED HEALTH CARE EDUCATION/TRAINING PROGRAM

## 2025-08-18 PROCEDURE — 96374 THER/PROPH/DIAG INJ IV PUSH: CPT

## 2025-08-18 PROCEDURE — 96375 TX/PRO/DX INJ NEW DRUG ADDON: CPT

## 2025-08-18 PROCEDURE — 96372 THER/PROPH/DIAG INJ SC/IM: CPT | Performed by: STUDENT IN AN ORGANIZED HEALTH CARE EDUCATION/TRAINING PROGRAM

## 2025-08-18 RX ORDER — DIPHENHYDRAMINE HYDROCHLORIDE 50 MG/ML
50 INJECTION, SOLUTION INTRAMUSCULAR; INTRAVENOUS ONCE
Status: COMPLETED | OUTPATIENT
Start: 2025-08-18 | End: 2025-08-18

## 2025-08-18 RX ADMIN — EPINEPHRINE 0.5 MG: 1 INJECTION INTRAMUSCULAR; INTRAVENOUS; SUBCUTANEOUS at 06:18

## 2025-08-18 RX ADMIN — FAMOTIDINE 20 MG: 10 INJECTION, SOLUTION INTRAVENOUS at 06:18

## 2025-08-18 RX ADMIN — DIPHENHYDRAMINE HYDROCHLORIDE 50 MG: 50 INJECTION, SOLUTION INTRAMUSCULAR; INTRAVENOUS at 06:18

## 2025-08-18 ASSESSMENT — ACTIVITIES OF DAILY LIVING (ADL)
ADLS_ACUITY_SCORE: 56

## 2025-08-18 ASSESSMENT — COLUMBIA-SUICIDE SEVERITY RATING SCALE - C-SSRS
6. HAVE YOU EVER DONE ANYTHING, STARTED TO DO ANYTHING, OR PREPARED TO DO ANYTHING TO END YOUR LIFE?: NO
2. HAVE YOU ACTUALLY HAD ANY THOUGHTS OF KILLING YOURSELF IN THE PAST MONTH?: NO
1. IN THE PAST MONTH, HAVE YOU WISHED YOU WERE DEAD OR WISHED YOU COULD GO TO SLEEP AND NOT WAKE UP?: NO

## 2025-08-19 ENCOUNTER — HOSPITAL ENCOUNTER (EMERGENCY)
Facility: CLINIC | Age: 35
Discharge: HOME | End: 2025-08-19
Attending: EMERGENCY MEDICINE | Admitting: EMERGENCY MEDICINE
Payer: COMMERCIAL

## 2025-08-19 ENCOUNTER — NURSE TRIAGE (OUTPATIENT)
Dept: NURSING | Facility: CLINIC | Age: 35
End: 2025-08-19
Payer: COMMERCIAL

## 2025-08-19 VITALS
BODY MASS INDEX: 26.7 KG/M2 | HEIGHT: 65 IN | RESPIRATION RATE: 20 BRPM | TEMPERATURE: 97.2 F | DIASTOLIC BLOOD PRESSURE: 73 MMHG | OXYGEN SATURATION: 98 % | WEIGHT: 160.27 LBS | SYSTOLIC BLOOD PRESSURE: 107 MMHG | HEART RATE: 78 BPM

## 2025-08-19 DIAGNOSIS — R07.89 ATYPICAL CHEST PAIN: ICD-10-CM

## 2025-08-19 DIAGNOSIS — R09.A2 GLOBUS SENSATION: Primary | ICD-10-CM

## 2025-08-19 LAB
ANION GAP SERPL CALCULATED.3IONS-SCNC: 10 MMOL/L (ref 7–15)
ATRIAL RATE - MUSE: 82 BPM
BASOPHILS # BLD AUTO: <0.03 10E3/UL (ref 0–0.2)
BASOPHILS NFR BLD AUTO: 0.4 %
BUN SERPL-MCNC: 14.2 MG/DL (ref 6–20)
CALCIUM SERPL-MCNC: 9.2 MG/DL (ref 8.8–10.4)
CHLORIDE SERPL-SCNC: 104 MMOL/L (ref 98–107)
CREAT SERPL-MCNC: 0.74 MG/DL (ref 0.51–0.95)
DIASTOLIC BLOOD PRESSURE - MUSE: NORMAL MMHG
EGFRCR SERPLBLD CKD-EPI 2021: >90 ML/MIN/1.73M2
EOSINOPHIL # BLD AUTO: 0.45 10E3/UL (ref 0–0.7)
EOSINOPHIL NFR BLD AUTO: 8.2 %
ERYTHROCYTE [DISTWIDTH] IN BLOOD BY AUTOMATED COUNT: 11.8 % (ref 10–15)
GLUCOSE SERPL-MCNC: 84 MG/DL (ref 70–99)
HCO3 SERPL-SCNC: 25 MMOL/L (ref 22–29)
HCT VFR BLD AUTO: 42.1 % (ref 35–47)
HGB BLD-MCNC: 14.2 G/DL (ref 11.7–15.7)
IMM GRANULOCYTES # BLD: <0.03 10E3/UL
IMM GRANULOCYTES NFR BLD: 0.4 %
INTERPRETATION ECG - MUSE: NORMAL
LYMPHOCYTES # BLD AUTO: 1.71 10E3/UL (ref 0.8–5.3)
LYMPHOCYTES NFR BLD AUTO: 31.1 %
MCH RBC QN AUTO: 28.8 PG (ref 26.5–33)
MCHC RBC AUTO-ENTMCNC: 33.7 G/DL (ref 31.5–36.5)
MCV RBC AUTO: 85.4 FL (ref 78–100)
MONOCYTES # BLD AUTO: 0.39 10E3/UL (ref 0–1.3)
MONOCYTES NFR BLD AUTO: 7.1 %
NEUTROPHILS # BLD AUTO: 2.9 10E3/UL (ref 1.6–8.3)
NEUTROPHILS NFR BLD AUTO: 52.8 %
NRBC # BLD AUTO: <0.03 10E3/UL
NRBC BLD AUTO-RTO: 0 /100
P AXIS - MUSE: 76 DEGREES
PLATELET # BLD AUTO: 282 10E3/UL (ref 150–450)
POTASSIUM SERPL-SCNC: 4.3 MMOL/L (ref 3.4–5.3)
PR INTERVAL - MUSE: 152 MS
QRS DURATION - MUSE: 94 MS
QT - MUSE: 398 MS
QTC - MUSE: 464 MS
R AXIS - MUSE: 76 DEGREES
RBC # BLD AUTO: 4.93 10E6/UL (ref 3.8–5.2)
SODIUM SERPL-SCNC: 139 MMOL/L (ref 135–145)
SYSTOLIC BLOOD PRESSURE - MUSE: NORMAL MMHG
T AXIS - MUSE: 29 DEGREES
TROPONIN T SERPL HS-MCNC: <6 NG/L
VENTRICULAR RATE- MUSE: 82 BPM
WBC # BLD AUTO: 5.49 10E3/UL (ref 4–11)

## 2025-08-19 PROCEDURE — 250N000011 HC RX IP 250 OP 636: Performed by: EMERGENCY MEDICINE

## 2025-08-19 PROCEDURE — 250N000009 HC RX 250: Performed by: EMERGENCY MEDICINE

## 2025-08-19 PROCEDURE — 84484 ASSAY OF TROPONIN QUANT: CPT | Performed by: EMERGENCY MEDICINE

## 2025-08-19 PROCEDURE — 31575 DIAGNOSTIC LARYNGOSCOPY: CPT

## 2025-08-19 PROCEDURE — 80048 BASIC METABOLIC PNL TOTAL CA: CPT | Performed by: EMERGENCY MEDICINE

## 2025-08-19 PROCEDURE — 99284 EMERGENCY DEPT VISIT MOD MDM: CPT | Mod: 25 | Performed by: EMERGENCY MEDICINE

## 2025-08-19 PROCEDURE — 85004 AUTOMATED DIFF WBC COUNT: CPT | Performed by: EMERGENCY MEDICINE

## 2025-08-19 PROCEDURE — 96374 THER/PROPH/DIAG INJ IV PUSH: CPT

## 2025-08-19 PROCEDURE — 36415 COLL VENOUS BLD VENIPUNCTURE: CPT | Performed by: EMERGENCY MEDICINE

## 2025-08-19 PROCEDURE — 93005 ELECTROCARDIOGRAM TRACING: CPT

## 2025-08-19 RX ORDER — HYDROXYZINE HYDROCHLORIDE 25 MG/1
25 TABLET, FILM COATED ORAL 3 TIMES DAILY PRN
Qty: 15 TABLET | Refills: 0 | Status: SHIPPED | OUTPATIENT
Start: 2025-08-19

## 2025-08-19 RX ORDER — DEXAMETHASONE SODIUM PHOSPHATE 10 MG/ML
10 INJECTION, SOLUTION INTRAMUSCULAR; INTRAVENOUS ONCE
Status: COMPLETED | OUTPATIENT
Start: 2025-08-19 | End: 2025-08-19

## 2025-08-19 RX ADMIN — TOPICAL ANESTHETIC: 200 SPRAY DENTAL; PERIODONTAL at 11:17

## 2025-08-19 RX ADMIN — DEXAMETHASONE SODIUM PHOSPHATE 10 MG: 10 INJECTION, SOLUTION INTRAMUSCULAR; INTRAVENOUS at 09:42

## 2025-08-19 ASSESSMENT — ACTIVITIES OF DAILY LIVING (ADL)
ADLS_ACUITY_SCORE: 56